# Patient Record
Sex: FEMALE | Race: WHITE | HISPANIC OR LATINO | URBAN - METROPOLITAN AREA
[De-identification: names, ages, dates, MRNs, and addresses within clinical notes are randomized per-mention and may not be internally consistent; named-entity substitution may affect disease eponyms.]

---

## 2018-06-21 ENCOUNTER — EMERGENCY (EMERGENCY)
Facility: HOSPITAL | Age: 23
LOS: 0 days | Discharge: HOME | End: 2018-06-21
Attending: EMERGENCY MEDICINE | Admitting: EMERGENCY MEDICINE
Payer: MEDICAID

## 2018-06-21 VITALS
DIASTOLIC BLOOD PRESSURE: 59 MMHG | HEART RATE: 88 BPM | RESPIRATION RATE: 18 BRPM | SYSTOLIC BLOOD PRESSURE: 101 MMHG | OXYGEN SATURATION: 100 %

## 2018-06-21 VITALS — WEIGHT: 106.04 LBS | HEIGHT: 62 IN

## 2018-06-21 LAB
ALBUMIN SERPL ELPH-MCNC: 4.1 G/DL — SIGNIFICANT CHANGE UP (ref 3.3–5)
ALLERGY+IMMUNOLOGY DIAG STUDY NOTE: SIGNIFICANT CHANGE UP
ALP SERPL-CCNC: 81 U/L — SIGNIFICANT CHANGE UP (ref 40–120)
ALT FLD-CCNC: 23 U/L — SIGNIFICANT CHANGE UP (ref 12–78)
ANION GAP SERPL CALC-SCNC: 8 MMOL/L — SIGNIFICANT CHANGE UP (ref 5–17)
APPEARANCE UR: CLEAR — SIGNIFICANT CHANGE UP
AST SERPL-CCNC: 19 U/L — SIGNIFICANT CHANGE UP (ref 15–37)
BACTERIA # UR AUTO: ABNORMAL
BASOPHILS # BLD AUTO: 0.03 K/UL — SIGNIFICANT CHANGE UP (ref 0–0.2)
BASOPHILS NFR BLD AUTO: 0.3 % — SIGNIFICANT CHANGE UP (ref 0–2)
BILIRUB SERPL-MCNC: 0.8 MG/DL — SIGNIFICANT CHANGE UP (ref 0.2–1.2)
BILIRUB UR-MCNC: NEGATIVE — SIGNIFICANT CHANGE UP
BUN SERPL-MCNC: 8 MG/DL — SIGNIFICANT CHANGE UP (ref 7–23)
CALCIUM SERPL-MCNC: 8.7 MG/DL — SIGNIFICANT CHANGE UP (ref 8.5–10.1)
CHLORIDE SERPL-SCNC: 103 MMOL/L — SIGNIFICANT CHANGE UP (ref 96–108)
CO2 SERPL-SCNC: 26 MMOL/L — SIGNIFICANT CHANGE UP (ref 22–31)
COLOR SPEC: YELLOW — SIGNIFICANT CHANGE UP
CREAT SERPL-MCNC: 0.64 MG/DL — SIGNIFICANT CHANGE UP (ref 0.5–1.3)
DIFF PNL FLD: ABNORMAL
EOSINOPHIL # BLD AUTO: 0.01 K/UL — SIGNIFICANT CHANGE UP (ref 0–0.5)
EOSINOPHIL NFR BLD AUTO: 0.1 % — SIGNIFICANT CHANGE UP (ref 0–6)
EPI CELLS # UR: ABNORMAL
GLUCOSE SERPL-MCNC: 94 MG/DL — SIGNIFICANT CHANGE UP (ref 70–99)
GLUCOSE UR QL: NEGATIVE MG/DL — SIGNIFICANT CHANGE UP
HCT VFR BLD CALC: 32.8 % — LOW (ref 34.5–45)
HCT VFR BLD CALC: 36.8 % — SIGNIFICANT CHANGE UP (ref 34.5–45)
HGB BLD-MCNC: 11.2 G/DL — LOW (ref 11.5–15.5)
HGB BLD-MCNC: 12.5 G/DL — SIGNIFICANT CHANGE UP (ref 11.5–15.5)
IMM GRANULOCYTES NFR BLD AUTO: 0.3 % — SIGNIFICANT CHANGE UP (ref 0–1.5)
INR BLD: 1.24 RATIO — HIGH (ref 0.88–1.16)
KETONES UR-MCNC: ABNORMAL
LEUKOCYTE ESTERASE UR-ACNC: ABNORMAL
LYMPHOCYTES # BLD AUTO: 0.95 K/UL — LOW (ref 1–3.3)
LYMPHOCYTES # BLD AUTO: 8.2 % — LOW (ref 13–44)
MCHC RBC-ENTMCNC: 31.5 PG — SIGNIFICANT CHANGE UP (ref 27–34)
MCHC RBC-ENTMCNC: 34 GM/DL — SIGNIFICANT CHANGE UP (ref 32–36)
MCV RBC AUTO: 92.7 FL — SIGNIFICANT CHANGE UP (ref 80–100)
MONOCYTES # BLD AUTO: 0.77 K/UL — SIGNIFICANT CHANGE UP (ref 0–0.9)
MONOCYTES NFR BLD AUTO: 6.6 % — SIGNIFICANT CHANGE UP (ref 2–14)
NEUTROPHILS # BLD AUTO: 9.78 K/UL — HIGH (ref 1.8–7.4)
NEUTROPHILS NFR BLD AUTO: 84.5 % — HIGH (ref 43–77)
NITRITE UR-MCNC: NEGATIVE — SIGNIFICANT CHANGE UP
NRBC # BLD: 0 /100 WBCS — SIGNIFICANT CHANGE UP (ref 0–0)
PH UR: 7 — SIGNIFICANT CHANGE UP (ref 5–8)
PLATELET # BLD AUTO: 322 K/UL — SIGNIFICANT CHANGE UP (ref 150–400)
POTASSIUM SERPL-MCNC: 4.2 MMOL/L — SIGNIFICANT CHANGE UP (ref 3.5–5.3)
POTASSIUM SERPL-SCNC: 4.2 MMOL/L — SIGNIFICANT CHANGE UP (ref 3.5–5.3)
PROT SERPL-MCNC: 7.9 GM/DL — SIGNIFICANT CHANGE UP (ref 6–8.3)
PROT UR-MCNC: NEGATIVE MG/DL — SIGNIFICANT CHANGE UP
PROTHROM AB SERPL-ACNC: 13.4 SEC — HIGH (ref 9.8–12.7)
RBC # BLD: 3.97 M/UL — SIGNIFICANT CHANGE UP (ref 3.8–5.2)
RBC # FLD: 12.5 % — SIGNIFICANT CHANGE UP (ref 10.3–14.5)
RBC CASTS # UR COMP ASSIST: SIGNIFICANT CHANGE UP /HPF (ref 0–4)
SODIUM SERPL-SCNC: 137 MMOL/L — SIGNIFICANT CHANGE UP (ref 135–145)
SP GR SPEC: 1.01 — SIGNIFICANT CHANGE UP (ref 1.01–1.02)
UROBILINOGEN FLD QL: NEGATIVE MG/DL — SIGNIFICANT CHANGE UP
WBC # BLD: 11.58 K/UL — HIGH (ref 3.8–10.5)
WBC # FLD AUTO: 11.58 K/UL — HIGH (ref 3.8–10.5)
WBC UR QL: SIGNIFICANT CHANGE UP

## 2018-06-21 PROCEDURE — 74177 CT ABD & PELVIS W/CONTRAST: CPT | Mod: 26

## 2018-06-21 PROCEDURE — 76856 US EXAM PELVIC COMPLETE: CPT | Mod: 26

## 2018-06-21 PROCEDURE — 99284 EMERGENCY DEPT VISIT MOD MDM: CPT

## 2018-06-21 RX ORDER — OXYCODONE HYDROCHLORIDE 5 MG/1
1 TABLET ORAL
Qty: 12 | Refills: 0 | OUTPATIENT
Start: 2018-06-21 | End: 2018-06-23

## 2018-06-21 RX ORDER — SODIUM CHLORIDE 9 MG/ML
1000 INJECTION INTRAMUSCULAR; INTRAVENOUS; SUBCUTANEOUS ONCE
Qty: 0 | Refills: 0 | Status: COMPLETED | OUTPATIENT
Start: 2018-06-21 | End: 2018-06-21

## 2018-06-21 RX ORDER — ONDANSETRON 8 MG/1
4 TABLET, FILM COATED ORAL ONCE
Qty: 0 | Refills: 0 | Status: COMPLETED | OUTPATIENT
Start: 2018-06-21 | End: 2018-06-21

## 2018-06-21 RX ORDER — SODIUM CHLORIDE 9 MG/ML
500 INJECTION INTRAMUSCULAR; INTRAVENOUS; SUBCUTANEOUS ONCE
Qty: 0 | Refills: 0 | Status: COMPLETED | OUTPATIENT
Start: 2018-06-21 | End: 2018-06-21

## 2018-06-21 RX ORDER — ACETAMINOPHEN 500 MG
1000 TABLET ORAL ONCE
Qty: 0 | Refills: 0 | Status: COMPLETED | OUTPATIENT
Start: 2018-06-21 | End: 2018-06-21

## 2018-06-21 RX ADMIN — ONDANSETRON 4 MILLIGRAM(S): 8 TABLET, FILM COATED ORAL at 19:55

## 2018-06-21 RX ADMIN — Medication 1000 MILLIGRAM(S): at 19:54

## 2018-06-21 RX ADMIN — SODIUM CHLORIDE 500 MILLILITER(S): 9 INJECTION INTRAMUSCULAR; INTRAVENOUS; SUBCUTANEOUS at 22:22

## 2018-06-21 RX ADMIN — SODIUM CHLORIDE 1000 MILLILITER(S): 9 INJECTION INTRAMUSCULAR; INTRAVENOUS; SUBCUTANEOUS at 19:30

## 2018-06-21 NOTE — ED STATDOCS - OBJECTIVE STATEMENT
21 y/o female with no pertinent past medical history presents to the ED c/o fever, 15 days abd pain, lower back pain, today felt feverish and nauseous along with (+) body aches.  Denies any vomiting/diarrhea, painful urination, Pt has been taking Acetaminophen for the pain. [facility] 5/25. Pt has no PCP.

## 2018-06-21 NOTE — ED STATDOCS - MEDICAL DECISION MAKING DETAILS
Repeat Hemoglobin 11.2.  Pt with improvement in pain.  Discussed CT and US findings with Dr. Barnes over phone, states agrees with us that patient may be discharged home at this time, supportive care, f/u with gynecology as outpatient.

## 2018-06-21 NOTE — ED STATDOCS - ENMT, MLM
Nasal mucosa clear. Slightly dry mucous membranes.    Throat has no vesicles, no oropharyngeal exudates and uvula is midline.

## 2018-06-21 NOTE — ED ADULT NURSE NOTE - OBJECTIVE STATEMENT
Pt reprots to ED compaining of abdominal pain and generalized body aches. Pt reports that its has been for past 2 days, but today she developed a fever

## 2018-06-21 NOTE — ED STATDOCS - PROGRESS NOTE DETAILS
signed Sabine Dobson PA-C Pt seen initially in intake by Dr Strauss.  ID [id number]  Pt here with her . Pt c/o lower abd pain x 15 days which worsened last night. pt denies vaginal symptoms, urinary symptoms, N/V/D. subjective fever. Pt states today she had pain in her "bones and head". Pt alert, NAD, is letting her  speak for her as pt says it hurts to talk. Afterwards I spoke to pt in private and pt stated that the man she is with is her , she feels safe at home, and does not have any other concerns or anything she wishes to confide in privacy. Plan labs, UA, CT. Pt agrees with plan of  care. signed Sabine Dobson PA-C   As per Dr Strauss, CT shows bleeding ovarian cyst and discussed results with pt using  services. Plan order type, sono, and gyn consult. Pt agrees with plan of  care. signed Sabine Dobson PA-C signed Sabine Dobson PA-C  Spoke to Dr Robertson, will see pt in consult, requests repeat H/H first.

## 2018-06-22 DIAGNOSIS — M79.1 MYALGIA: ICD-10-CM

## 2018-06-22 DIAGNOSIS — R50.9 FEVER, UNSPECIFIED: ICD-10-CM

## 2018-06-22 DIAGNOSIS — N83.299 OTHER OVARIAN CYST, UNSPECIFIED SIDE: ICD-10-CM

## 2018-06-22 DIAGNOSIS — R10.9 UNSPECIFIED ABDOMINAL PAIN: ICD-10-CM

## 2018-06-22 LAB — ABO RH CONFIRMATION: SIGNIFICANT CHANGE UP

## 2018-06-24 RX ORDER — CEPHALEXIN 500 MG
1 CAPSULE ORAL
Qty: 14 | Refills: 0 | OUTPATIENT
Start: 2018-06-24 | End: 2018-06-30

## 2018-06-24 NOTE — ED POST DISCHARGE NOTE - RESULT SUMMARY
Patient contacted with , ID #[id number]. Urine culture shows Ecoli, sensitive to keflex. Contacted patient who states she has been feeling better after leaving the ED. Advised patient I would be sending ABX to the pharmacy. Advised patient to continue full dose and follow up with primary care doctor and GYN as driected from discharge.   Ramsey Marley PA-C

## 2018-06-25 ENCOUNTER — EMERGENCY (EMERGENCY)
Facility: HOSPITAL | Age: 23
LOS: 0 days | Discharge: HOME | End: 2018-06-26
Attending: EMERGENCY MEDICINE | Admitting: EMERGENCY MEDICINE
Payer: MEDICAID

## 2018-06-25 VITALS
HEART RATE: 77 BPM | OXYGEN SATURATION: 100 % | SYSTOLIC BLOOD PRESSURE: 110 MMHG | RESPIRATION RATE: 16 BRPM | TEMPERATURE: 99 F | DIASTOLIC BLOOD PRESSURE: 78 MMHG

## 2018-06-25 VITALS — HEIGHT: 61 IN | WEIGHT: 106.04 LBS

## 2018-06-25 DIAGNOSIS — R10.32 LEFT LOWER QUADRANT PAIN: ICD-10-CM

## 2018-06-25 DIAGNOSIS — N83.299 OTHER OVARIAN CYST, UNSPECIFIED SIDE: ICD-10-CM

## 2018-06-25 PROCEDURE — 99053 MED SERV 10PM-8AM 24 HR FAC: CPT

## 2018-06-25 PROCEDURE — 99284 EMERGENCY DEPT VISIT MOD MDM: CPT | Mod: 25

## 2018-06-25 RX ORDER — MORPHINE SULFATE 50 MG/1
4 CAPSULE, EXTENDED RELEASE ORAL ONCE
Qty: 0 | Refills: 0 | Status: DISCONTINUED | OUTPATIENT
Start: 2018-06-25 | End: 2018-06-25

## 2018-06-25 RX ORDER — SODIUM CHLORIDE 9 MG/ML
1000 INJECTION INTRAMUSCULAR; INTRAVENOUS; SUBCUTANEOUS ONCE
Qty: 0 | Refills: 0 | Status: COMPLETED | OUTPATIENT
Start: 2018-06-25 | End: 2018-06-25

## 2018-06-25 RX ORDER — ONDANSETRON 8 MG/1
4 TABLET, FILM COATED ORAL ONCE
Qty: 0 | Refills: 0 | Status: COMPLETED | OUTPATIENT
Start: 2018-06-25 | End: 2018-06-26

## 2018-06-25 RX ORDER — SODIUM CHLORIDE 9 MG/ML
3 INJECTION INTRAMUSCULAR; INTRAVENOUS; SUBCUTANEOUS ONCE
Qty: 0 | Refills: 0 | Status: COMPLETED | OUTPATIENT
Start: 2018-06-25 | End: 2018-06-25

## 2018-06-25 RX ADMIN — SODIUM CHLORIDE 3 MILLILITER(S): 9 INJECTION INTRAMUSCULAR; INTRAVENOUS; SUBCUTANEOUS at 23:57

## 2018-06-25 RX ADMIN — SODIUM CHLORIDE 1000 MILLILITER(S): 9 INJECTION INTRAMUSCULAR; INTRAVENOUS; SUBCUTANEOUS at 23:57

## 2018-06-25 NOTE — ED ADULT NURSE NOTE - OBJECTIVE STATEMENT
pt is 22 y.o. female c/o LLQ abdominal pain for several days, pt denies any trauma or injury to the area. pt does not have a primary care doctor. no change in bowel or bladder habits. LLQ tender to touch, denies radiation to other areas.

## 2018-06-26 LAB
ALBUMIN SERPL ELPH-MCNC: 3.7 G/DL — SIGNIFICANT CHANGE UP (ref 3.3–5)
ALP SERPL-CCNC: 81 U/L — SIGNIFICANT CHANGE UP (ref 40–120)
ALT FLD-CCNC: 39 U/L — SIGNIFICANT CHANGE UP (ref 12–78)
ANION GAP SERPL CALC-SCNC: 5 MMOL/L — SIGNIFICANT CHANGE UP (ref 5–17)
APPEARANCE UR: CLEAR — SIGNIFICANT CHANGE UP
APTT BLD: 32.9 SEC — SIGNIFICANT CHANGE UP (ref 27.5–37.4)
AST SERPL-CCNC: 45 U/L — HIGH (ref 15–37)
BACTERIA # UR AUTO: ABNORMAL
BASOPHILS # BLD AUTO: 0.03 K/UL — SIGNIFICANT CHANGE UP (ref 0–0.2)
BASOPHILS NFR BLD AUTO: 0.5 % — SIGNIFICANT CHANGE UP (ref 0–2)
BILIRUB SERPL-MCNC: 0.4 MG/DL — SIGNIFICANT CHANGE UP (ref 0.2–1.2)
BILIRUB UR-MCNC: NEGATIVE — SIGNIFICANT CHANGE UP
BLD GP AB SCN SERPL QL: SIGNIFICANT CHANGE UP
BUN SERPL-MCNC: 7 MG/DL — SIGNIFICANT CHANGE UP (ref 7–23)
CALCIUM SERPL-MCNC: 8.8 MG/DL — SIGNIFICANT CHANGE UP (ref 8.5–10.1)
CHLORIDE SERPL-SCNC: 102 MMOL/L — SIGNIFICANT CHANGE UP (ref 96–108)
CO2 SERPL-SCNC: 30 MMOL/L — SIGNIFICANT CHANGE UP (ref 22–31)
COLOR SPEC: YELLOW — SIGNIFICANT CHANGE UP
CREAT SERPL-MCNC: 0.57 MG/DL — SIGNIFICANT CHANGE UP (ref 0.5–1.3)
DIFF PNL FLD: ABNORMAL
EOSINOPHIL # BLD AUTO: 0.12 K/UL — SIGNIFICANT CHANGE UP (ref 0–0.5)
EOSINOPHIL NFR BLD AUTO: 1.9 % — SIGNIFICANT CHANGE UP (ref 0–6)
EPI CELLS # UR: SIGNIFICANT CHANGE UP
GLUCOSE SERPL-MCNC: 81 MG/DL — SIGNIFICANT CHANGE UP (ref 70–99)
GLUCOSE UR QL: NEGATIVE MG/DL — SIGNIFICANT CHANGE UP
HCG SERPL-ACNC: <1 MIU/ML — SIGNIFICANT CHANGE UP
HCT VFR BLD CALC: 37.7 % — SIGNIFICANT CHANGE UP (ref 34.5–45)
HGB BLD-MCNC: 12.7 G/DL — SIGNIFICANT CHANGE UP (ref 11.5–15.5)
IMM GRANULOCYTES NFR BLD AUTO: 0.2 % — SIGNIFICANT CHANGE UP (ref 0–1.5)
INR BLD: 1.06 RATIO — SIGNIFICANT CHANGE UP (ref 0.88–1.16)
KETONES UR-MCNC: NEGATIVE — SIGNIFICANT CHANGE UP
LEUKOCYTE ESTERASE UR-ACNC: NEGATIVE — SIGNIFICANT CHANGE UP
LIDOCAIN IGE QN: 142 U/L — SIGNIFICANT CHANGE UP (ref 73–393)
LYMPHOCYTES # BLD AUTO: 1.43 K/UL — SIGNIFICANT CHANGE UP (ref 1–3.3)
LYMPHOCYTES # BLD AUTO: 22.2 % — SIGNIFICANT CHANGE UP (ref 13–44)
MCHC RBC-ENTMCNC: 31.1 PG — SIGNIFICANT CHANGE UP (ref 27–34)
MCHC RBC-ENTMCNC: 33.7 GM/DL — SIGNIFICANT CHANGE UP (ref 32–36)
MCV RBC AUTO: 92.4 FL — SIGNIFICANT CHANGE UP (ref 80–100)
MONOCYTES # BLD AUTO: 0.74 K/UL — SIGNIFICANT CHANGE UP (ref 0–0.9)
MONOCYTES NFR BLD AUTO: 11.5 % — SIGNIFICANT CHANGE UP (ref 2–14)
NEUTROPHILS # BLD AUTO: 4.11 K/UL — SIGNIFICANT CHANGE UP (ref 1.8–7.4)
NEUTROPHILS NFR BLD AUTO: 63.7 % — SIGNIFICANT CHANGE UP (ref 43–77)
NITRITE UR-MCNC: NEGATIVE — SIGNIFICANT CHANGE UP
NRBC # BLD: 0 /100 WBCS — SIGNIFICANT CHANGE UP (ref 0–0)
PH UR: 7 — SIGNIFICANT CHANGE UP (ref 5–8)
PLATELET # BLD AUTO: 420 K/UL — HIGH (ref 150–400)
POTASSIUM SERPL-MCNC: 4.1 MMOL/L — SIGNIFICANT CHANGE UP (ref 3.5–5.3)
POTASSIUM SERPL-SCNC: 4.1 MMOL/L — SIGNIFICANT CHANGE UP (ref 3.5–5.3)
PROT SERPL-MCNC: 7.9 GM/DL — SIGNIFICANT CHANGE UP (ref 6–8.3)
PROT UR-MCNC: NEGATIVE MG/DL — SIGNIFICANT CHANGE UP
PROTHROM AB SERPL-ACNC: 11.5 SEC — SIGNIFICANT CHANGE UP (ref 9.8–12.7)
RBC # BLD: 4.08 M/UL — SIGNIFICANT CHANGE UP (ref 3.8–5.2)
RBC # FLD: 11.9 % — SIGNIFICANT CHANGE UP (ref 10.3–14.5)
RBC CASTS # UR COMP ASSIST: NEGATIVE /HPF — SIGNIFICANT CHANGE UP (ref 0–4)
SODIUM SERPL-SCNC: 137 MMOL/L — SIGNIFICANT CHANGE UP (ref 135–145)
SP GR SPEC: 1 — LOW (ref 1.01–1.02)
TYPE + AB SCN PNL BLD: SIGNIFICANT CHANGE UP
UROBILINOGEN FLD QL: NEGATIVE MG/DL — SIGNIFICANT CHANGE UP
WBC # BLD: 6.44 K/UL — SIGNIFICANT CHANGE UP (ref 3.8–10.5)
WBC # FLD AUTO: 6.44 K/UL — SIGNIFICANT CHANGE UP (ref 3.8–10.5)
WBC UR QL: SIGNIFICANT CHANGE UP

## 2018-06-26 PROCEDURE — 76830 TRANSVAGINAL US NON-OB: CPT | Mod: 26

## 2018-06-26 RX ADMIN — ONDANSETRON 4 MILLIGRAM(S): 8 TABLET, FILM COATED ORAL at 01:54

## 2018-06-26 RX ADMIN — MORPHINE SULFATE 4 MILLIGRAM(S): 50 CAPSULE, EXTENDED RELEASE ORAL at 01:54

## 2018-06-26 NOTE — ED ADULT NURSE REASSESSMENT NOTE - NS ED NURSE REASSESS COMMENT FT1
patient discharged home. iv taken out. written and verbal discharge and followup instructions given to patient, patient verbalized back understanding. additional verbal instructions given. discharged home with significant other at 0402.

## 2018-06-26 NOTE — ED STATDOCS - OBJECTIVE STATEMENT
Pt. is a 23 yo F with lower abdominal pain for the past few days.  Pt. states she was in the ED for the same recently and was diagnosed with ruptured ovarian cyst.  Pt. still needs to follow up with gynecologist.   did not  antibiotic at the pharmacy yet.  Pt. completed pain meds already and is now back in pain.   brought pt. for another prescription of pain meds.  Pt. states there is pain in lower abdomen and left flank.  No vomiting or fever per patient.  Some pain with urination.

## 2018-06-26 NOTE — ED POST DISCHARGE NOTE - RESULT SUMMARY
AMY Ecoli sensitive to cephalosporins [id number]+ CFUs PT discharged on appropriate Tx.  PT called back at all points of contact.  Message left to call HH back.  AMY 6/26/18.

## 2018-06-26 NOTE — ED STATDOCS - MEDICAL DECISION MAKING DETAILS
Pain from ruptured ovarian cyst and slight UTI.  Pt. to continue treatment for UTI; need to check labs to make sure hemoglobin stable and pelvic fluid resolving.

## 2018-06-27 ENCOUNTER — RESULT REVIEW (OUTPATIENT)
Age: 23
End: 2018-06-27

## 2018-07-12 ENCOUNTER — EMERGENCY (EMERGENCY)
Facility: HOSPITAL | Age: 23
LOS: 0 days | Discharge: HOME | End: 2018-07-12
Attending: EMERGENCY MEDICINE | Admitting: EMERGENCY MEDICINE
Payer: MEDICAID

## 2018-07-12 VITALS
RESPIRATION RATE: 16 BRPM | TEMPERATURE: 100 F | HEART RATE: 112 BPM | OXYGEN SATURATION: 100 % | DIASTOLIC BLOOD PRESSURE: 73 MMHG | SYSTOLIC BLOOD PRESSURE: 116 MMHG

## 2018-07-12 VITALS
SYSTOLIC BLOOD PRESSURE: 112 MMHG | HEIGHT: 61 IN | OXYGEN SATURATION: 100 % | HEART RATE: 99 BPM | WEIGHT: 110.01 LBS | RESPIRATION RATE: 17 BRPM | TEMPERATURE: 98 F | DIASTOLIC BLOOD PRESSURE: 70 MMHG

## 2018-07-12 VITALS
OXYGEN SATURATION: 100 % | HEART RATE: 89 BPM | TEMPERATURE: 98 F | RESPIRATION RATE: 19 BRPM | SYSTOLIC BLOOD PRESSURE: 121 MMHG | DIASTOLIC BLOOD PRESSURE: 78 MMHG

## 2018-07-12 VITALS — HEIGHT: 62 IN | WEIGHT: 115.08 LBS

## 2018-07-12 DIAGNOSIS — Z11.8 ENCOUNTER FOR SCREENING FOR OTHER INFECTIOUS AND PARASITIC DISEASES: ICD-10-CM

## 2018-07-12 DIAGNOSIS — N30.91 CYSTITIS, UNSPECIFIED WITH HEMATURIA: ICD-10-CM

## 2018-07-12 DIAGNOSIS — R30.0 DYSURIA: ICD-10-CM

## 2018-07-12 DIAGNOSIS — R10.9 UNSPECIFIED ABDOMINAL PAIN: ICD-10-CM

## 2018-07-12 DIAGNOSIS — R30.9 PAINFUL MICTURITION, UNSPECIFIED: ICD-10-CM

## 2018-07-12 LAB
APPEARANCE UR: CLEAR — SIGNIFICANT CHANGE UP
BACTERIA # UR AUTO: ABNORMAL
BILIRUB UR-MCNC: NEGATIVE — SIGNIFICANT CHANGE UP
COLOR SPEC: YELLOW — SIGNIFICANT CHANGE UP
DIFF PNL FLD: ABNORMAL
EPI CELLS # UR: SIGNIFICANT CHANGE UP
GLUCOSE UR QL: NEGATIVE MG/DL — SIGNIFICANT CHANGE UP
KETONES UR-MCNC: ABNORMAL
LEUKOCYTE ESTERASE UR-ACNC: ABNORMAL
NITRITE UR-MCNC: NEGATIVE — SIGNIFICANT CHANGE UP
PH UR: 6.5 — SIGNIFICANT CHANGE UP (ref 5–8)
PROT UR-MCNC: 30 MG/DL
RBC CASTS # UR COMP ASSIST: ABNORMAL /HPF (ref 0–4)
SP GR SPEC: 1.01 — SIGNIFICANT CHANGE UP (ref 1.01–1.02)
UROBILINOGEN FLD QL: 1 MG/DL
WBC UR QL: ABNORMAL

## 2018-07-12 PROCEDURE — 99284 EMERGENCY DEPT VISIT MOD MDM: CPT | Mod: 25

## 2018-07-12 RX ORDER — NITROFURANTOIN MACROCRYSTAL 50 MG
100 CAPSULE ORAL ONCE
Qty: 0 | Refills: 0 | Status: COMPLETED | OUTPATIENT
Start: 2018-07-12 | End: 2018-07-12

## 2018-07-12 RX ORDER — PHENAZOPYRIDINE HCL 100 MG
100 TABLET ORAL ONCE
Qty: 0 | Refills: 0 | Status: COMPLETED | OUTPATIENT
Start: 2018-07-12 | End: 2018-07-12

## 2018-07-12 RX ORDER — NITROFURANTOIN MACROCRYSTAL 50 MG
1 CAPSULE ORAL
Qty: 20 | Refills: 0 | OUTPATIENT
Start: 2018-07-12 | End: 2018-07-21

## 2018-07-12 RX ORDER — PHENAZOPYRIDINE HCL 100 MG
2 TABLET ORAL
Qty: 12 | Refills: 0 | OUTPATIENT
Start: 2018-07-12 | End: 2018-07-13

## 2018-07-12 RX ORDER — IBUPROFEN 200 MG
600 TABLET ORAL ONCE
Qty: 0 | Refills: 0 | Status: COMPLETED | OUTPATIENT
Start: 2018-07-12 | End: 2018-07-12

## 2018-07-12 RX ADMIN — Medication 600 MILLIGRAM(S): at 21:17

## 2018-07-12 RX ADMIN — Medication 100 MILLIGRAM(S): at 21:17

## 2018-07-12 NOTE — ED STATDOCS - PROGRESS NOTE DETAILS
23 yo female presents with continued dysuria, burning sensation x 4 days. Pt was seen yesterday started n cipro and states the pain is still present. +sub fever. Denies abd pain, n/v/d,c/sweating.

## 2018-07-12 NOTE — ED STATDOCS - MEDICAL DECISION MAKING DETAILS
Plan for ibuprofen Macrobid and peridium, reassess. Plan for ibuprofen Macrobid and pyridium, reassess.

## 2018-07-12 NOTE — ED PROVIDER NOTE - OBJECTIVE STATEMENT
pt presents with suyprpubinc abd pain intermittent achy non radiating a/w dysuria on outpt abx from [facility] no vaginal bleeding or dichsrge . denies fever. denies HA or neck pain. no chest pain or sob. no n/v/d. . no back pain. no motor or sensory deficits. denies illicit drug use. no recent travel. no rash. no other acute issues symptoms or concerns
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
## 2018-07-12 NOTE — ED ADULT NURSE NOTE - CHPI ED SYMPTOMS NEG
no chills/no nausea/no tingling/no decreased eating/drinking/no dizziness/no vomiting/no weakness/no numbness/no fever

## 2018-07-12 NOTE — ED ADULT NURSE NOTE - CHIEF COMPLAINT QUOTE
fever, urinary difficulty. Was seen yesterday in [facility] center and in ED. Given cipro. Taking for one day with no sx improvement.

## 2018-07-12 NOTE — ED PROVIDER NOTE - MEDICAL DECISION MAKING DETAILS
cont outpt abx f/u [facility] return to ed for intractable abd pain fever any overall worsening

## 2018-07-12 NOTE — ED ADULT TRIAGE NOTE - CHIEF COMPLAINT QUOTE
fever, urinary difficulty. Was seen yesterday in [facility] center and in ED. Given cipro. Taking for one day with no sx improvement.

## 2018-07-12 NOTE — ED ADULT NURSE NOTE - OBJECTIVE STATEMENT
pt arrives to ED ambulatory complaining of urinary symptoms. pt was seen yesterday in ED for urainry symptoms and was given antibx for UTI. alert and oriented x 4.

## 2018-07-12 NOTE — ED STATDOCS - ATTENDING CONTRIBUTION TO CARE
Attending Contribution to Care: I, Kymberly Sandhu, performed the initial face to face bedside interview with this patient regarding history of present illness, review of symptoms and relevant past medical, social and family history.  I completed an independent physical examination.  I was the initial provider who evaluated this patient. I have signed out the follow up of any pending tests (i.e. labs, radiological studies) to the ACP.  I have communicated the patient’s plan of care and disposition with the ACP.

## 2018-07-12 NOTE — ED ADULT NURSE NOTE - OBJECTIVE STATEMENT
Pt presented to ED c/o urinary symptoms. As per pt, pt's been experiencing burning on urination, urinary frequency for 3 days. Was seen @ urgent care yesterday as sent home w/ ABX. Pt came in tonight due to continuations of urinary pain, fever, and HA. Took acetaminophen 1 hr PTA.

## 2018-07-13 LAB
CULTURE RESULTS: NO GROWTH — SIGNIFICANT CHANGE UP
CULTURE RESULTS: NO GROWTH — SIGNIFICANT CHANGE UP
SPECIMEN SOURCE: SIGNIFICANT CHANGE UP
SPECIMEN SOURCE: SIGNIFICANT CHANGE UP

## 2018-09-01 ENCOUNTER — OUTPATIENT (OUTPATIENT)
Dept: OUTPATIENT SERVICES | Facility: HOSPITAL | Age: 23
LOS: 1 days | End: 2018-09-01

## 2018-09-16 ENCOUNTER — EMERGENCY (EMERGENCY)
Facility: HOSPITAL | Age: 23
LOS: 0 days | Discharge: HOME | End: 2018-09-16
Attending: EMERGENCY MEDICINE | Admitting: EMERGENCY MEDICINE
Payer: MEDICAID

## 2018-09-16 VITALS — WEIGHT: 110.89 LBS | HEIGHT: 60 IN

## 2018-09-16 VITALS
DIASTOLIC BLOOD PRESSURE: 63 MMHG | TEMPERATURE: 98 F | SYSTOLIC BLOOD PRESSURE: 105 MMHG | HEART RATE: 74 BPM | OXYGEN SATURATION: 100 %

## 2018-09-16 DIAGNOSIS — Z3A.01 LESS THAN 8 WEEKS GESTATION OF PREGNANCY: ICD-10-CM

## 2018-09-16 DIAGNOSIS — O20.9 HEMORRHAGE IN EARLY PREGNANCY, UNSPECIFIED: ICD-10-CM

## 2018-09-16 LAB
ALBUMIN SERPL ELPH-MCNC: 4.1 G/DL — SIGNIFICANT CHANGE UP (ref 3.3–5)
ALP SERPL-CCNC: 50 U/L — SIGNIFICANT CHANGE UP (ref 40–120)
ALT FLD-CCNC: 40 U/L — SIGNIFICANT CHANGE UP (ref 12–78)
ANION GAP SERPL CALC-SCNC: 7 MMOL/L — SIGNIFICANT CHANGE UP (ref 5–17)
APPEARANCE UR: CLEAR — SIGNIFICANT CHANGE UP
AST SERPL-CCNC: 20 U/L — SIGNIFICANT CHANGE UP (ref 15–37)
BACTERIA # UR AUTO: ABNORMAL
BASOPHILS # BLD AUTO: 0.06 K/UL — SIGNIFICANT CHANGE UP (ref 0–0.2)
BASOPHILS NFR BLD AUTO: 0.7 % — SIGNIFICANT CHANGE UP (ref 0–2)
BILIRUB SERPL-MCNC: 0.3 MG/DL — SIGNIFICANT CHANGE UP (ref 0.2–1.2)
BILIRUB UR-MCNC: NEGATIVE — SIGNIFICANT CHANGE UP
BLD GP AB SCN SERPL QL: SIGNIFICANT CHANGE UP
BUN SERPL-MCNC: 9 MG/DL — SIGNIFICANT CHANGE UP (ref 7–23)
CALCIUM SERPL-MCNC: 8.9 MG/DL — SIGNIFICANT CHANGE UP (ref 8.5–10.1)
CHLORIDE SERPL-SCNC: 103 MMOL/L — SIGNIFICANT CHANGE UP (ref 96–108)
CO2 SERPL-SCNC: 27 MMOL/L — SIGNIFICANT CHANGE UP (ref 22–31)
COLOR SPEC: YELLOW — SIGNIFICANT CHANGE UP
CREAT SERPL-MCNC: 0.53 MG/DL — SIGNIFICANT CHANGE UP (ref 0.5–1.3)
DIFF PNL FLD: NEGATIVE — SIGNIFICANT CHANGE UP
EOSINOPHIL # BLD AUTO: 0.13 K/UL — SIGNIFICANT CHANGE UP (ref 0–0.5)
EOSINOPHIL NFR BLD AUTO: 1.5 % — SIGNIFICANT CHANGE UP (ref 0–6)
EPI CELLS # UR: SIGNIFICANT CHANGE UP
GLUCOSE SERPL-MCNC: 83 MG/DL — SIGNIFICANT CHANGE UP (ref 70–99)
GLUCOSE UR QL: NEGATIVE MG/DL — SIGNIFICANT CHANGE UP
HCG SERPL-ACNC: HIGH MIU/ML
HCT VFR BLD CALC: 38 % — SIGNIFICANT CHANGE UP (ref 34.5–45)
HGB BLD-MCNC: 13.3 G/DL — SIGNIFICANT CHANGE UP (ref 11.5–15.5)
IMM GRANULOCYTES NFR BLD AUTO: 0.3 % — SIGNIFICANT CHANGE UP (ref 0–1.5)
KETONES UR-MCNC: NEGATIVE — SIGNIFICANT CHANGE UP
LEUKOCYTE ESTERASE UR-ACNC: ABNORMAL
LYMPHOCYTES # BLD AUTO: 1.84 K/UL — SIGNIFICANT CHANGE UP (ref 1–3.3)
LYMPHOCYTES # BLD AUTO: 20.7 % — SIGNIFICANT CHANGE UP (ref 13–44)
MCHC RBC-ENTMCNC: 31.4 PG — SIGNIFICANT CHANGE UP (ref 27–34)
MCHC RBC-ENTMCNC: 35 GM/DL — SIGNIFICANT CHANGE UP (ref 32–36)
MCV RBC AUTO: 89.6 FL — SIGNIFICANT CHANGE UP (ref 80–100)
MONOCYTES # BLD AUTO: 0.62 K/UL — SIGNIFICANT CHANGE UP (ref 0–0.9)
MONOCYTES NFR BLD AUTO: 7 % — SIGNIFICANT CHANGE UP (ref 2–14)
NEUTROPHILS # BLD AUTO: 6.22 K/UL — SIGNIFICANT CHANGE UP (ref 1.8–7.4)
NEUTROPHILS NFR BLD AUTO: 69.8 % — SIGNIFICANT CHANGE UP (ref 43–77)
NITRITE UR-MCNC: NEGATIVE — SIGNIFICANT CHANGE UP
NRBC # BLD: 0 /100 WBCS — SIGNIFICANT CHANGE UP (ref 0–0)
PH UR: 6.5 — SIGNIFICANT CHANGE UP (ref 5–8)
PLATELET # BLD AUTO: 363 K/UL — SIGNIFICANT CHANGE UP (ref 150–400)
POTASSIUM SERPL-MCNC: 3.9 MMOL/L — SIGNIFICANT CHANGE UP (ref 3.5–5.3)
POTASSIUM SERPL-SCNC: 3.9 MMOL/L — SIGNIFICANT CHANGE UP (ref 3.5–5.3)
PROT SERPL-MCNC: 7.8 GM/DL — SIGNIFICANT CHANGE UP (ref 6–8.3)
PROT UR-MCNC: NEGATIVE MG/DL — SIGNIFICANT CHANGE UP
RBC # BLD: 4.24 M/UL — SIGNIFICANT CHANGE UP (ref 3.8–5.2)
RBC # FLD: 12.1 % — SIGNIFICANT CHANGE UP (ref 10.3–14.5)
RBC CASTS # UR COMP ASSIST: SIGNIFICANT CHANGE UP /HPF (ref 0–4)
SODIUM SERPL-SCNC: 137 MMOL/L — SIGNIFICANT CHANGE UP (ref 135–145)
SP GR SPEC: 1.01 — SIGNIFICANT CHANGE UP (ref 1.01–1.02)
TYPE + AB SCN PNL BLD: SIGNIFICANT CHANGE UP
UROBILINOGEN FLD QL: NEGATIVE MG/DL — SIGNIFICANT CHANGE UP
WBC # BLD: 8.9 K/UL — SIGNIFICANT CHANGE UP (ref 3.8–10.5)
WBC # FLD AUTO: 8.9 K/UL — SIGNIFICANT CHANGE UP (ref 3.8–10.5)
WBC UR QL: SIGNIFICANT CHANGE UP

## 2018-09-16 PROCEDURE — 76830 TRANSVAGINAL US NON-OB: CPT | Mod: 26

## 2018-09-16 PROCEDURE — 99284 EMERGENCY DEPT VISIT MOD MDM: CPT

## 2018-09-16 RX ORDER — SODIUM CHLORIDE 9 MG/ML
1000 INJECTION INTRAMUSCULAR; INTRAVENOUS; SUBCUTANEOUS ONCE
Qty: 0 | Refills: 0 | Status: COMPLETED | OUTPATIENT
Start: 2018-09-16 | End: 2018-09-16

## 2018-09-16 RX ADMIN — SODIUM CHLORIDE 2000 MILLILITER(S): 9 INJECTION INTRAMUSCULAR; INTRAVENOUS; SUBCUTANEOUS at 17:53

## 2018-09-16 NOTE — ED STATDOCS - ATTENDING CONTRIBUTION TO CARE
I, Yakov Cavazos, performed the initial face to face bedside interview with this patient regarding history of present illness, review of symptoms and relevant past medical, social and family history.  I completed an independent physical examination.  I was the initial provider who evaluated this patient. I have signed out the follow up of any pending tests (i.e. labs, radiological studies) to the ACP.  I have communicated the patient’s plan of care and disposition with the ACP.  The history, relevant review of systems, past medical and surgical history, medical decision making, and physical examination was documented by the scribe in my presence and I attest to the accuracy of the documentation.

## 2018-09-16 NOTE — ED ADULT NURSE NOTE - NSIMPLEMENTINTERV_GEN_ALL_ED
Implemented All Universal Safety Interventions:  [place] to call system. Call bell, personal items and telephone within reach. Instruct patient to call for assistance. Room bathroom lighting operational. Non-slip footwear when patient is off stretcher. Physically safe environment: no spills, clutter or unnecessary equipment. Stretcher in lowest position, wheels locked, appropriate side rails in place.

## 2018-09-16 NOTE — ED ADULT TRIAGE NOTE - CHIEF COMPLAINT QUOTE
Pt is seven weeks pregnant. Pt reports that she noticed pink tinged blood today.  Denies any abd pain/cramping at this time.

## 2018-09-16 NOTE — ED STATDOCS - PROGRESS NOTE DETAILS
Pt is a 24 y/o female with no PMH, 7 weeks pregnant with c/o vaginal bleeding since this morning. Pt also endorses back pain which started several weeks ago around the time she noticed she was pregnant. Pt is followed by an OB/GYN. Plan is for pt to undergo labs, sono and reevaluation. -Liliana Kimbrough PA-C Pt doing well. Discussed normal lab findings and results for sono still pending. Will update pt when sono report is in. Discussed discharge if all is normal and follow up with OB/GYN at [facility] this week. -Liliana Kimbrough PA-C Discussed sonogram findings with pt and boyfriend. Expressed importance of 48 hour follow up with ob/gyn and they understand. Pt ready for discharge. -Liliana Kimbrough PA-C

## 2018-09-16 NOTE — ED STATDOCS - OBJECTIVE STATEMENT
22 y/o female with PMHx presents to the ED c/o vaginal bleeding noticed this morning. No clots. +back pain throughout pregnancy. Denies abd pain, pelvic pain. Pt is 7 weeks pregnant. LNMP July 25th. G1. Pt reports that she is following with an OB/GYN. PI #[id number].

## 2018-09-22 ENCOUNTER — EMERGENCY (EMERGENCY)
Facility: HOSPITAL | Age: 23
LOS: 0 days | Discharge: HOME | End: 2018-09-22
Attending: EMERGENCY MEDICINE
Payer: COMMERCIAL

## 2018-09-22 VITALS
RESPIRATION RATE: 18 BRPM | OXYGEN SATURATION: 100 % | DIASTOLIC BLOOD PRESSURE: 65 MMHG | SYSTOLIC BLOOD PRESSURE: 104 MMHG | HEART RATE: 82 BPM | TEMPERATURE: 98 F

## 2018-09-22 VITALS — WEIGHT: 110.89 LBS | HEIGHT: 61 IN

## 2018-09-22 DIAGNOSIS — M54.9 DORSALGIA, UNSPECIFIED: ICD-10-CM

## 2018-09-22 DIAGNOSIS — O99.351 DISEASES OF THE NERVOUS SYSTEM COMPLICATING PREGNANCY, FIRST TRIMESTER: ICD-10-CM
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
## 2018-09-22 DIAGNOSIS — O99.89 OTHER SPECIFIED DISEASES AND CONDITIONS COMPLICATING PREGNANCY, CHILDBIRTH AND THE PUERPERIUM: ICD-10-CM
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
## 2018-09-22 DIAGNOSIS — G89.11 ACUTE PAIN DUE TO TRAUMA: ICD-10-CM

## 2018-09-22 PROCEDURE — 99283 EMERGENCY DEPT VISIT LOW MDM: CPT

## 2018-09-22 NOTE — ED STATDOCS - NS_ ATTENDINGSCRIBEDETAILS _ED_A_ED_FT
I, Codey Brenner MD,  performed the initial face to face bedside interview with this patient regarding history of present illness, review of symptoms and relevant past medical, social and family history.  I completed an independent physical examination.  I was the initial provider who evaluated this patient.  The history, relevant review of systems, past medical and surgical history, medical decision making, and physical examination was documented by the scribe in my presence and I attest to the accuracy of the documentation.

## 2018-09-22 NOTE — ED ADULT NURSE NOTE - NSIMPLEMENTINTERV_GEN_ALL_ED
Implemented All Universal Safety Interventions:  [place] to call system. Call bell, personal items and telephone within reach. Instruct patient to call for assistance. Room bathroom lighting operational. Non-slip footwear when patient is off stretcher. Physically safe environment: no spills, clutter or unnecessary equipment. Stretcher in lowest position, wheels locked, appropriate side rails in place.
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
## 2018-09-22 NOTE — ED STATDOCS - NS ED ROS FT
Constitutional: No fever or chills  Eyes: No visual changes  HEENT: No throat pain  CV: No chest pain  Resp: No SOB no cough  GI: No abd pain, nausea or vomiting  : No dysuria  MSK: (+) back pain  Skin: No rash  Neuro: No headache

## 2018-09-22 NOTE — ED STATDOCS - PHYSICAL EXAMINATION
Constitutional: mild distress AAOx3  Eyes: PERRLA EOMI  Head: Normocephalic atraumatic  Mouth: MMM  Cardiac: regular rate   Resp: Lungs CTAB  GI: Abd s/nt/nd  Neuro: CN2-12 intact  Skin: No rashes, no bruising to back, chest or abdomen  Musculoskeletal: mild paraspinal ttp, no midline ttp, no ttp to chest wall, full ROM of bilateral LE and UE. Constitutional: NAD AAOx3  Eyes: PERRLA EOMI  Head: Normocephalic atraumatic  Mouth: MMM  Cardiac: regular rate   Resp: Lungs CTAB  GI: Abd s/nt/nd  Neuro: CN2-12 intact normal strength and sensation normal gait  Skin: No rashes, no bruising to back, chest or abdomen  Musculoskeletal: mild paraspinal ttp, no midline ttp, no ttp to chest wall, full ROM of bilateral LE and UE.
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
## 2018-10-02 DIAGNOSIS — Z71.89 OTHER SPECIFIED COUNSELING: ICD-10-CM

## 2018-10-19 PROBLEM — Z00.00 ENCOUNTER FOR PREVENTIVE HEALTH EXAMINATION: Status: ACTIVE | Noted: 2018-10-19

## 2018-10-24 ENCOUNTER — APPOINTMENT (OUTPATIENT)
Dept: ANTEPARTUM | Facility: CLINIC | Age: 23
End: 2018-10-24
Payer: MEDICAID

## 2018-10-24 ENCOUNTER — ASOB RESULT (OUTPATIENT)
Age: 23
End: 2018-10-24

## 2018-10-24 PROCEDURE — 76813 OB US NUCHAL MEAS 1 GEST: CPT

## 2018-12-12 ENCOUNTER — ASOB RESULT (OUTPATIENT)
Age: 23
End: 2018-12-12

## 2018-12-12 ENCOUNTER — APPOINTMENT (OUTPATIENT)
Dept: ANTEPARTUM | Facility: CLINIC | Age: 23
End: 2018-12-12
Payer: MEDICAID

## 2018-12-12 PROCEDURE — 76805 OB US >/= 14 WKS SNGL FETUS: CPT

## 2019-02-08 ENCOUNTER — APPOINTMENT (OUTPATIENT)
Dept: ANTEPARTUM | Facility: CLINIC | Age: 24
End: 2019-02-08

## 2019-03-14 ENCOUNTER — INPATIENT (INPATIENT)
Facility: HOSPITAL | Age: 24
LOS: 3 days | Discharge: HOME | End: 2019-03-18
Attending: OBSTETRICS & GYNECOLOGY | Admitting: OBSTETRICS & GYNECOLOGY
Payer: MEDICAID

## 2019-03-14 VITALS — HEIGHT: 60 IN | WEIGHT: 141.1 LBS

## 2019-03-14 LAB
ALBUMIN SERPL ELPH-MCNC: 3.1 G/DL — LOW (ref 3.3–5)
ALP SERPL-CCNC: 134 U/L — HIGH (ref 40–120)
ALT FLD-CCNC: 19 U/L — SIGNIFICANT CHANGE UP (ref 12–78)
ANION GAP SERPL CALC-SCNC: 9 MMOL/L — SIGNIFICANT CHANGE UP (ref 5–17)
APPEARANCE UR: CLEAR — SIGNIFICANT CHANGE UP
AST SERPL-CCNC: 21 U/L — SIGNIFICANT CHANGE UP (ref 15–37)
BACTERIA # UR AUTO: ABNORMAL
BILIRUB SERPL-MCNC: 0.3 MG/DL — SIGNIFICANT CHANGE UP (ref 0.2–1.2)
BILIRUB UR-MCNC: NEGATIVE — SIGNIFICANT CHANGE UP
BLD GP AB SCN SERPL QL: SIGNIFICANT CHANGE UP
BUN SERPL-MCNC: 5 MG/DL — LOW (ref 7–23)
CALCIUM SERPL-MCNC: 8.8 MG/DL — SIGNIFICANT CHANGE UP (ref 8.5–10.1)
CHLORIDE SERPL-SCNC: 105 MMOL/L — SIGNIFICANT CHANGE UP (ref 96–108)
CO2 SERPL-SCNC: 21 MMOL/L — LOW (ref 22–31)
COLOR SPEC: YELLOW — SIGNIFICANT CHANGE UP
CREAT ?TM UR-MCNC: 14 MG/DL — SIGNIFICANT CHANGE UP
CREAT SERPL-MCNC: 0.52 MG/DL — SIGNIFICANT CHANGE UP (ref 0.5–1.3)
DIFF PNL FLD: NEGATIVE — SIGNIFICANT CHANGE UP
EPI CELLS # UR: SIGNIFICANT CHANGE UP
GLUCOSE BLDC GLUCOMTR-MCNC: 81 MG/DL — SIGNIFICANT CHANGE UP (ref 70–99)
GLUCOSE SERPL-MCNC: 79 MG/DL — SIGNIFICANT CHANGE UP (ref 70–99)
GLUCOSE UR QL: NEGATIVE MG/DL — SIGNIFICANT CHANGE UP
HCT VFR BLD CALC: 37.6 % — SIGNIFICANT CHANGE UP (ref 34.5–45)
HGB BLD-MCNC: 12.8 G/DL — SIGNIFICANT CHANGE UP (ref 11.5–15.5)
KETONES UR-MCNC: NEGATIVE — SIGNIFICANT CHANGE UP
LDH SERPL L TO P-CCNC: 156 U/L — SIGNIFICANT CHANGE UP (ref 84–241)
LEUKOCYTE ESTERASE UR-ACNC: NEGATIVE — SIGNIFICANT CHANGE UP
MCHC RBC-ENTMCNC: 31.8 PG — SIGNIFICANT CHANGE UP (ref 27–34)
MCHC RBC-ENTMCNC: 34 GM/DL — SIGNIFICANT CHANGE UP (ref 32–36)
MCV RBC AUTO: 93.3 FL — SIGNIFICANT CHANGE UP (ref 80–100)
NITRITE UR-MCNC: NEGATIVE — SIGNIFICANT CHANGE UP
NRBC # BLD: 0 /100 WBCS — SIGNIFICANT CHANGE UP (ref 0–0)
PH UR: 7 — SIGNIFICANT CHANGE UP (ref 5–8)
PLATELET # BLD AUTO: 283 K/UL — SIGNIFICANT CHANGE UP (ref 150–400)
POTASSIUM SERPL-MCNC: 3.7 MMOL/L — SIGNIFICANT CHANGE UP (ref 3.5–5.3)
POTASSIUM SERPL-SCNC: 3.7 MMOL/L — SIGNIFICANT CHANGE UP (ref 3.5–5.3)
PROT ?TM UR-MCNC: 49 MG/DL — HIGH (ref 0–12)
PROT SERPL-MCNC: 7.2 GM/DL — SIGNIFICANT CHANGE UP (ref 6–8.3)
PROT UR-MCNC: 100 MG/DL
PROT/CREAT UR-RTO: 3.5 RATIO — HIGH (ref 0–0.2)
RBC # BLD: 4.03 M/UL — SIGNIFICANT CHANGE UP (ref 3.8–5.2)
RBC # FLD: 12.3 % — SIGNIFICANT CHANGE UP (ref 10.3–14.5)
RBC CASTS # UR COMP ASSIST: ABNORMAL /HPF (ref 0–4)
SODIUM SERPL-SCNC: 135 MMOL/L — SIGNIFICANT CHANGE UP (ref 135–145)
SP GR SPEC: 1 — LOW (ref 1.01–1.02)
TYPE + AB SCN PNL BLD: SIGNIFICANT CHANGE UP
UROBILINOGEN FLD QL: NEGATIVE MG/DL — SIGNIFICANT CHANGE UP
WBC # BLD: 7.03 K/UL — SIGNIFICANT CHANGE UP (ref 3.8–10.5)
WBC # FLD AUTO: 7.03 K/UL — SIGNIFICANT CHANGE UP (ref 3.8–10.5)
WBC UR QL: SIGNIFICANT CHANGE UP

## 2019-03-14 RX ORDER — SODIUM CHLORIDE 9 MG/ML
1000 INJECTION, SOLUTION INTRAVENOUS
Qty: 0 | Refills: 0 | Status: DISCONTINUED | OUTPATIENT
Start: 2019-03-14 | End: 2019-03-16

## 2019-03-14 RX ORDER — MAGNESIUM SULFATE 500 MG/ML
4 VIAL (ML) INJECTION ONCE
Qty: 0 | Refills: 0 | Status: COMPLETED | OUTPATIENT
Start: 2019-03-14 | End: 2019-03-14

## 2019-03-14 RX ORDER — ACETAMINOPHEN 500 MG
500 TABLET ORAL EVERY 4 HOURS
Qty: 0 | Refills: 0 | Status: DISCONTINUED | OUTPATIENT
Start: 2019-03-14 | End: 2019-03-15

## 2019-03-14 RX ORDER — AMPICILLIN TRIHYDRATE 250 MG
CAPSULE ORAL
Qty: 0 | Refills: 0 | Status: DISCONTINUED | OUTPATIENT
Start: 2019-03-14 | End: 2019-03-16

## 2019-03-14 RX ORDER — ACETAMINOPHEN 500 MG
650 TABLET ORAL EVERY 4 HOURS
Qty: 0 | Refills: 0 | Status: DISCONTINUED | OUTPATIENT
Start: 2019-03-14 | End: 2019-03-14

## 2019-03-14 RX ORDER — AMPICILLIN TRIHYDRATE 250 MG
1 CAPSULE ORAL EVERY 4 HOURS
Qty: 0 | Refills: 0 | Status: DISCONTINUED | OUTPATIENT
Start: 2019-03-15 | End: 2019-03-16

## 2019-03-14 RX ORDER — AMPICILLIN TRIHYDRATE 250 MG
2 CAPSULE ORAL ONCE
Qty: 0 | Refills: 0 | Status: COMPLETED | OUTPATIENT
Start: 2019-03-14 | End: 2019-03-14

## 2019-03-14 RX ORDER — MAGNESIUM SULFATE 500 MG/ML
2 VIAL (ML) INJECTION
Qty: 40 | Refills: 0 | Status: DISCONTINUED | OUTPATIENT
Start: 2019-03-14 | End: 2019-03-18

## 2019-03-14 RX ORDER — SODIUM CHLORIDE 9 MG/ML
3 INJECTION INTRAMUSCULAR; INTRAVENOUS; SUBCUTANEOUS ONCE
Qty: 0 | Refills: 0 | Status: DISCONTINUED | OUTPATIENT
Start: 2019-03-14 | End: 2019-03-18

## 2019-03-14 RX ADMIN — SODIUM CHLORIDE 50 MILLILITER(S): 9 INJECTION, SOLUTION INTRAVENOUS at 21:29

## 2019-03-14 RX ADMIN — Medication 116 GRAM(S): at 21:54

## 2019-03-14 RX ADMIN — Medication 500 MILLIGRAM(S): at 21:15

## 2019-03-14 RX ADMIN — Medication 12 MILLIGRAM(S): at 22:15

## 2019-03-14 RX ADMIN — Medication 300 GRAM(S): at 21:20

## 2019-03-14 RX ADMIN — Medication 50 GM/HR: at 21:50

## 2019-03-14 RX ADMIN — Medication 500 MILLIGRAM(S): at 21:00

## 2019-03-14 NOTE — PATIENT PROFILE OB - CURRENT PREGNANCY COMPLICATIONS, OB PROFILE
Gestational Diabetes/Preeclampsia Gestational Diabetes/Gestational Age less than 36 Weeks/Maternal Unknown GBS/Preeclampsia

## 2019-03-15 LAB
APTT BLD: 27.9 SEC — SIGNIFICANT CHANGE UP (ref 27.5–36.3)
GLUCOSE BLDC GLUCOMTR-MCNC: 109 MG/DL — HIGH (ref 70–99)
GLUCOSE BLDC GLUCOMTR-MCNC: 111 MG/DL — HIGH (ref 70–99)
GLUCOSE BLDC GLUCOMTR-MCNC: 117 MG/DL — HIGH (ref 70–99)
GLUCOSE BLDC GLUCOMTR-MCNC: 95 MG/DL — SIGNIFICANT CHANGE UP (ref 70–99)
INR BLD: 0.89 RATIO — SIGNIFICANT CHANGE UP (ref 0.88–1.16)
MAGNESIUM SERPL-MCNC: 5.7 MG/DL — HIGH (ref 1.6–2.6)
MAGNESIUM SERPL-MCNC: 5.8 MG/DL — HIGH (ref 1.6–2.6)
MAGNESIUM SERPL-MCNC: 6.2 MG/DL — HIGH (ref 1.6–2.6)
MAGNESIUM SERPL-MCNC: 6.5 MG/DL — HIGH (ref 1.6–2.6)
PROTHROM AB SERPL-ACNC: 9.8 SEC — LOW (ref 10–12.9)
T PALLIDUM AB TITR SER: NEGATIVE — SIGNIFICANT CHANGE UP

## 2019-03-15 RX ORDER — ACETAMINOPHEN 500 MG
500 TABLET ORAL EVERY 4 HOURS
Qty: 0 | Refills: 0 | Status: DISCONTINUED | OUTPATIENT
Start: 2019-03-15 | End: 2019-03-18

## 2019-03-15 RX ORDER — MAGNESIUM SULFATE 500 MG/ML
1.5 VIAL (ML) INJECTION
Qty: 40 | Refills: 0 | Status: DISCONTINUED | OUTPATIENT
Start: 2019-03-15 | End: 2019-03-18

## 2019-03-15 RX ORDER — OXYTOCIN 10 UNIT/ML
2 VIAL (ML) INJECTION
Qty: 30 | Refills: 0 | Status: DISCONTINUED | OUTPATIENT
Start: 2019-03-15 | End: 2019-03-18

## 2019-03-15 RX ADMIN — Medication 500 MILLIGRAM(S): at 03:00

## 2019-03-15 RX ADMIN — Medication 108 GRAM(S): at 22:01

## 2019-03-15 RX ADMIN — Medication 108 GRAM(S): at 11:15

## 2019-03-15 RX ADMIN — Medication 500 MILLIGRAM(S): at 03:15

## 2019-03-15 RX ADMIN — Medication 108 GRAM(S): at 03:00

## 2019-03-15 RX ADMIN — Medication 108 GRAM(S): at 15:31

## 2019-03-15 RX ADMIN — Medication 12 MILLIGRAM(S): at 22:20

## 2019-03-15 RX ADMIN — Medication 2 MILLIUNIT(S)/MIN: at 15:25

## 2019-03-15 RX ADMIN — Medication 108 GRAM(S): at 06:43

## 2019-03-16 ENCOUNTER — RESULT REVIEW (OUTPATIENT)
Age: 24
End: 2019-03-16

## 2019-03-16 LAB
ALBUMIN SERPL ELPH-MCNC: 2.4 G/DL — LOW (ref 3.3–5)
ALP SERPL-CCNC: 118 U/L — SIGNIFICANT CHANGE UP (ref 40–120)
ALT FLD-CCNC: 14 U/L — SIGNIFICANT CHANGE UP (ref 12–78)
ANION GAP SERPL CALC-SCNC: 13 MMOL/L — SIGNIFICANT CHANGE UP (ref 5–17)
APTT BLD: 23 SEC — LOW (ref 27.5–36.3)
AST SERPL-CCNC: 16 U/L — SIGNIFICANT CHANGE UP (ref 15–37)
BASOPHILS # BLD AUTO: 0.01 K/UL — SIGNIFICANT CHANGE UP (ref 0–0.2)
BASOPHILS NFR BLD AUTO: 0.1 % — SIGNIFICANT CHANGE UP (ref 0–2)
BILIRUB SERPL-MCNC: 0.5 MG/DL — SIGNIFICANT CHANGE UP (ref 0.2–1.2)
BUN SERPL-MCNC: 7 MG/DL — SIGNIFICANT CHANGE UP (ref 7–23)
CALCIUM SERPL-MCNC: 6.6 MG/DL — LOW (ref 8.5–10.1)
CHLORIDE SERPL-SCNC: 101 MMOL/L — SIGNIFICANT CHANGE UP (ref 96–108)
CO2 SERPL-SCNC: 20 MMOL/L — LOW (ref 22–31)
CREAT SERPL-MCNC: 0.59 MG/DL — SIGNIFICANT CHANGE UP (ref 0.5–1.3)
EOSINOPHIL # BLD AUTO: 0 K/UL — SIGNIFICANT CHANGE UP (ref 0–0.5)
EOSINOPHIL NFR BLD AUTO: 0 % — SIGNIFICANT CHANGE UP (ref 0–6)
GLUCOSE BLDC GLUCOMTR-MCNC: 119 MG/DL — HIGH (ref 70–99)
GLUCOSE SERPL-MCNC: 105 MG/DL — HIGH (ref 70–99)
GROUP B BETA STREP DNA (PCR): SIGNIFICANT CHANGE UP
GROUP B BETA STREP INTERPRETATION: SIGNIFICANT CHANGE UP
HCT VFR BLD CALC: 32.5 % — LOW (ref 34.5–45)
HGB BLD-MCNC: 11.1 G/DL — LOW (ref 11.5–15.5)
IMM GRANULOCYTES NFR BLD AUTO: 0.7 % — SIGNIFICANT CHANGE UP (ref 0–1.5)
INR BLD: 0.9 RATIO — SIGNIFICANT CHANGE UP (ref 0.88–1.16)
LYMPHOCYTES # BLD AUTO: 0.7 K/UL — LOW (ref 1–3.3)
LYMPHOCYTES # BLD AUTO: 6.5 % — LOW (ref 13–44)
MAGNESIUM SERPL-MCNC: 4.8 MG/DL — HIGH (ref 1.6–2.6)
MAGNESIUM SERPL-MCNC: 5.3 MG/DL — HIGH (ref 1.6–2.6)
MAGNESIUM SERPL-MCNC: 5.9 MG/DL — HIGH (ref 1.6–2.6)
MAGNESIUM SERPL-MCNC: 6 MG/DL — HIGH (ref 1.6–2.6)
MCHC RBC-ENTMCNC: 32.2 PG — SIGNIFICANT CHANGE UP (ref 27–34)
MCHC RBC-ENTMCNC: 34.2 GM/DL — SIGNIFICANT CHANGE UP (ref 32–36)
MCV RBC AUTO: 94.2 FL — SIGNIFICANT CHANGE UP (ref 80–100)
MONOCYTES # BLD AUTO: 0.37 K/UL — SIGNIFICANT CHANGE UP (ref 0–0.9)
MONOCYTES NFR BLD AUTO: 3.5 % — SIGNIFICANT CHANGE UP (ref 2–14)
NEUTROPHILS # BLD AUTO: 9.54 K/UL — HIGH (ref 1.8–7.4)
NEUTROPHILS NFR BLD AUTO: 89.2 % — HIGH (ref 43–77)
NRBC # BLD: 0 /100 WBCS — SIGNIFICANT CHANGE UP (ref 0–0)
PLATELET # BLD AUTO: 258 K/UL — SIGNIFICANT CHANGE UP (ref 150–400)
POTASSIUM SERPL-MCNC: 3.8 MMOL/L — SIGNIFICANT CHANGE UP (ref 3.5–5.3)
POTASSIUM SERPL-SCNC: 3.8 MMOL/L — SIGNIFICANT CHANGE UP (ref 3.5–5.3)
PROT SERPL-MCNC: 5.8 GM/DL — LOW (ref 6–8.3)
PROTHROM AB SERPL-ACNC: 10 SEC — SIGNIFICANT CHANGE UP (ref 10–12.9)
RBC # BLD: 3.45 M/UL — LOW (ref 3.8–5.2)
RBC # FLD: 12.4 % — SIGNIFICANT CHANGE UP (ref 10.3–14.5)
SODIUM SERPL-SCNC: 134 MMOL/L — LOW (ref 135–145)
SOURCE GROUP B STREP: SIGNIFICANT CHANGE UP
WBC # BLD: 10.7 K/UL — HIGH (ref 3.8–10.5)
WBC # FLD AUTO: 10.7 K/UL — HIGH (ref 3.8–10.5)

## 2019-03-16 PROCEDURE — 88307 TISSUE EXAM BY PATHOLOGIST: CPT | Mod: 26

## 2019-03-16 RX ORDER — OXYTOCIN 10 UNIT/ML
25 VIAL (ML) INJECTION
Qty: 20 | Refills: 0 | Status: DISCONTINUED | OUTPATIENT
Start: 2019-03-16 | End: 2019-03-18

## 2019-03-16 RX ORDER — DIBUCAINE 1 %
1 OINTMENT (GRAM) RECTAL EVERY 4 HOURS
Qty: 0 | Refills: 0 | Status: DISCONTINUED | OUTPATIENT
Start: 2019-03-16 | End: 2019-03-18

## 2019-03-16 RX ORDER — OXYTOCIN 10 UNIT/ML
333.33 VIAL (ML) INJECTION
Qty: 20 | Refills: 0 | Status: DISCONTINUED | OUTPATIENT
Start: 2019-03-16 | End: 2019-03-18

## 2019-03-16 RX ORDER — GLYCERIN ADULT
1 SUPPOSITORY, RECTAL RECTAL AT BEDTIME
Qty: 0 | Refills: 0 | Status: DISCONTINUED | OUTPATIENT
Start: 2019-03-16 | End: 2019-03-18

## 2019-03-16 RX ORDER — SODIUM CHLORIDE 9 MG/ML
3 INJECTION INTRAMUSCULAR; INTRAVENOUS; SUBCUTANEOUS EVERY 8 HOURS
Qty: 0 | Refills: 0 | Status: DISCONTINUED | OUTPATIENT
Start: 2019-03-16 | End: 2019-03-16

## 2019-03-16 RX ORDER — ACETAMINOPHEN 500 MG
650 TABLET ORAL EVERY 6 HOURS
Qty: 0 | Refills: 0 | Status: DISCONTINUED | OUTPATIENT
Start: 2019-03-16 | End: 2019-03-18

## 2019-03-16 RX ORDER — DIPHENHYDRAMINE HCL 50 MG
25 CAPSULE ORAL EVERY 6 HOURS
Qty: 0 | Refills: 0 | Status: DISCONTINUED | OUTPATIENT
Start: 2019-03-16 | End: 2019-03-18

## 2019-03-16 RX ORDER — DIBUCAINE 1 %
1 OINTMENT (GRAM) RECTAL EVERY 4 HOURS
Qty: 0 | Refills: 0 | Status: DISCONTINUED | OUTPATIENT
Start: 2019-03-16 | End: 2019-03-16

## 2019-03-16 RX ORDER — SIMETHICONE 80 MG/1
80 TABLET, CHEWABLE ORAL EVERY 6 HOURS
Qty: 0 | Refills: 0 | Status: DISCONTINUED | OUTPATIENT
Start: 2019-03-16 | End: 2019-03-18

## 2019-03-16 RX ORDER — MAGNESIUM HYDROXIDE 400 MG/1
30 TABLET, CHEWABLE ORAL
Qty: 0 | Refills: 0 | Status: DISCONTINUED | OUTPATIENT
Start: 2019-03-16 | End: 2019-03-18

## 2019-03-16 RX ORDER — SODIUM CHLORIDE 9 MG/ML
3 INJECTION INTRAMUSCULAR; INTRAVENOUS; SUBCUTANEOUS EVERY 8 HOURS
Qty: 0 | Refills: 0 | Status: DISCONTINUED | OUTPATIENT
Start: 2019-03-16 | End: 2019-03-18

## 2019-03-16 RX ORDER — AER TRAVELER 0.5 G/1
1 SOLUTION RECTAL; TOPICAL EVERY 4 HOURS
Qty: 0 | Refills: 0 | Status: DISCONTINUED | OUTPATIENT
Start: 2019-03-16 | End: 2019-03-16

## 2019-03-16 RX ORDER — CARBOPROST TROMETHAMINE 250 UG/ML
250 INJECTION, SOLUTION INTRAMUSCULAR ONCE
Qty: 0 | Refills: 0 | Status: COMPLETED | OUTPATIENT
Start: 2019-03-16 | End: 2019-03-16

## 2019-03-16 RX ORDER — PRAMOXINE HYDROCHLORIDE 150 MG/15G
1 AEROSOL, FOAM RECTAL EVERY 4 HOURS
Qty: 0 | Refills: 0 | Status: DISCONTINUED | OUTPATIENT
Start: 2019-03-16 | End: 2019-03-16

## 2019-03-16 RX ORDER — DOCUSATE SODIUM 100 MG
100 CAPSULE ORAL
Qty: 0 | Refills: 0 | Status: DISCONTINUED | OUTPATIENT
Start: 2019-03-16 | End: 2019-03-18

## 2019-03-16 RX ORDER — HYDROCORTISONE 1 %
1 OINTMENT (GRAM) TOPICAL EVERY 4 HOURS
Qty: 0 | Refills: 0 | Status: DISCONTINUED | OUTPATIENT
Start: 2019-03-16 | End: 2019-03-18

## 2019-03-16 RX ORDER — IBUPROFEN 200 MG
600 TABLET ORAL EVERY 6 HOURS
Qty: 0 | Refills: 0 | Status: DISCONTINUED | OUTPATIENT
Start: 2019-03-16 | End: 2019-03-18

## 2019-03-16 RX ORDER — HYDROCORTISONE 1 %
1 OINTMENT (GRAM) TOPICAL EVERY 4 HOURS
Qty: 0 | Refills: 0 | Status: DISCONTINUED | OUTPATIENT
Start: 2019-03-16 | End: 2019-03-16

## 2019-03-16 RX ORDER — PRAMOXINE HYDROCHLORIDE 150 MG/15G
1 AEROSOL, FOAM RECTAL EVERY 4 HOURS
Qty: 0 | Refills: 0 | Status: DISCONTINUED | OUTPATIENT
Start: 2019-03-16 | End: 2019-03-18

## 2019-03-16 RX ORDER — TETANUS TOXOID, REDUCED DIPHTHERIA TOXOID AND ACELLULAR PERTUSSIS VACCINE, ADSORBED 5; 2.5; 8; 8; 2.5 [IU]/.5ML; [IU]/.5ML; UG/.5ML; UG/.5ML; UG/.5ML
0.5 SUSPENSION INTRAMUSCULAR ONCE
Qty: 0 | Refills: 0 | Status: DISCONTINUED | OUTPATIENT
Start: 2019-03-16 | End: 2019-03-18

## 2019-03-16 RX ORDER — LANOLIN
1 OINTMENT (GRAM) TOPICAL EVERY 6 HOURS
Qty: 0 | Refills: 0 | Status: DISCONTINUED | OUTPATIENT
Start: 2019-03-16 | End: 2019-03-18

## 2019-03-16 RX ORDER — AER TRAVELER 0.5 G/1
1 SOLUTION RECTAL; TOPICAL EVERY 4 HOURS
Qty: 0 | Refills: 0 | Status: DISCONTINUED | OUTPATIENT
Start: 2019-03-16 | End: 2019-03-18

## 2019-03-16 RX ORDER — IBUPROFEN 200 MG
600 TABLET ORAL EVERY 6 HOURS
Qty: 0 | Refills: 0 | Status: DISCONTINUED | OUTPATIENT
Start: 2019-03-16 | End: 2019-03-16

## 2019-03-16 RX ADMIN — Medication 1 TABLET(S): at 17:55

## 2019-03-16 RX ADMIN — Medication 108 GRAM(S): at 02:02

## 2019-03-16 RX ADMIN — CARBOPROST TROMETHAMINE 250 MICROGRAM(S): 250 INJECTION, SOLUTION INTRAMUSCULAR at 09:50

## 2019-03-16 RX ADMIN — Medication 1000 MILLIUNIT(S)/MIN: at 10:13

## 2019-03-16 RX ADMIN — Medication 75 MILLIUNIT(S)/MIN: at 14:13

## 2019-03-16 RX ADMIN — Medication 108 GRAM(S): at 06:06

## 2019-03-16 RX ADMIN — Medication 37.5 GM/HR: at 02:07

## 2019-03-17 LAB
BASOPHILS # BLD AUTO: 0.01 K/UL — SIGNIFICANT CHANGE UP (ref 0–0.2)
BASOPHILS NFR BLD AUTO: 0.1 % — SIGNIFICANT CHANGE UP (ref 0–2)
EOSINOPHIL # BLD AUTO: 0.02 K/UL — SIGNIFICANT CHANGE UP (ref 0–0.5)
EOSINOPHIL NFR BLD AUTO: 0.2 % — SIGNIFICANT CHANGE UP (ref 0–6)
HCT VFR BLD CALC: 27.8 % — LOW (ref 34.5–45)
HGB BLD-MCNC: 9.2 G/DL — LOW (ref 11.5–15.5)
IMM GRANULOCYTES NFR BLD AUTO: 0.7 % — SIGNIFICANT CHANGE UP (ref 0–1.5)
LYMPHOCYTES # BLD AUTO: 1.49 K/UL — SIGNIFICANT CHANGE UP (ref 1–3.3)
LYMPHOCYTES # BLD AUTO: 15.9 % — SIGNIFICANT CHANGE UP (ref 13–44)
MAGNESIUM SERPL-MCNC: 5.2 MG/DL — HIGH (ref 1.6–2.6)
MCHC RBC-ENTMCNC: 31.6 PG — SIGNIFICANT CHANGE UP (ref 27–34)
MCHC RBC-ENTMCNC: 33.1 GM/DL — SIGNIFICANT CHANGE UP (ref 32–36)
MCV RBC AUTO: 95.5 FL — SIGNIFICANT CHANGE UP (ref 80–100)
MONOCYTES # BLD AUTO: 0.73 K/UL — SIGNIFICANT CHANGE UP (ref 0–0.9)
MONOCYTES NFR BLD AUTO: 7.8 % — SIGNIFICANT CHANGE UP (ref 2–14)
NEUTROPHILS # BLD AUTO: 7.08 K/UL — SIGNIFICANT CHANGE UP (ref 1.8–7.4)
NEUTROPHILS NFR BLD AUTO: 75.3 % — SIGNIFICANT CHANGE UP (ref 43–77)
NRBC # BLD: 0 /100 WBCS — SIGNIFICANT CHANGE UP (ref 0–0)
PLATELET # BLD AUTO: 235 K/UL — SIGNIFICANT CHANGE UP (ref 150–400)
RBC # BLD: 2.91 M/UL — LOW (ref 3.8–5.2)
RBC # FLD: 12.5 % — SIGNIFICANT CHANGE UP (ref 10.3–14.5)
WBC # BLD: 9.4 K/UL — SIGNIFICANT CHANGE UP (ref 3.8–10.5)
WBC # FLD AUTO: 9.4 K/UL — SIGNIFICANT CHANGE UP (ref 3.8–10.5)

## 2019-03-17 RX ADMIN — Medication 600 MILLIGRAM(S): at 04:15

## 2019-03-17 RX ADMIN — SODIUM CHLORIDE 3 MILLILITER(S): 9 INJECTION INTRAMUSCULAR; INTRAVENOUS; SUBCUTANEOUS at 23:18

## 2019-03-17 NOTE — PROGRESS NOTE ADULT - ATTENDING COMMENTS
Interviewed patient. Discussed with resident and agree with findings, assessment and plan.  Karrie Savage MD

## 2019-03-17 NOTE — PROGRESS NOTE ADULT - SUBJECTIVE AND OBJECTIVE BOX
S: Patient evaluated at bedside. She has no complaints. She reports pain is well controlled with pain meds. She denies headache, dizziness, chest pain, palpitations, shortness of breath, nausea, vomiting, heavy vaginal bleeding. She has been ambulating without assistance, voiding spontaneously, tolerating diet, and is breastfeeding. No BM. Passing flatus    O: Vital Signs Last 24 Hrs  T(C): 36.8 (17 Mar 2019 07:30), Max: 37.1 (16 Mar 2019 21:35)  T(F): 98.2 (17 Mar 2019 07:30), Max: 98.8 (16 Mar 2019 21:35)  HR: 90 (17 Mar 2019 07:30) (83 - 102)  BP: 111/69 (17 Mar 2019 07:30) (100/66 - 144/84)  RR: 16 (17 Mar 2019 07:30) (15 - 18)  SpO2: 100% (17 Mar 2019 07:30) (97% - 100%)    Gen: NAD, sitting upright in bed  CV: RRR, no murmurs auscultated  Resp: CTABL, no wheezes or rhonchi auscultates  Abd: Soft, NT/ND, fundus firm  Ext: No tenderness, mild edema, 2+ patellar reflexes    Labs:                        9.2    9.40  )-----------( 235      ( 17 Mar 2019 04:02 )             27.8     Magnesium, Serum (19 @ 04:02)    Magnesium, Serum: 5.2 mg/dL      A: 23y  now PPD# 1 s/p  doing well.    Plan:  - Blood pressures continue to be WNL post-partum  - Routine postpartum care  - Encouraged out of bed  - Regular diet  - will discontinue Magnesium at 10 am (24 hours post-partum)  - will discontinue IV fluids at 10 am as she is tolerating PO S: Patient evaluated at bedside, partner at bedside. She has no complaints this morning. She reports pain is well controlled with pain meds. She denies headache, dizziness, chest pain, palpitations, shortness of breath, nausea, vomiting, heavy vaginal bleeding. Patient has yet to ambulate.  She is voiding spontaneously and tolerating diet. No BM yet. Passing flatus. She plans on breast feeding.     O: Vital Signs Last 24 Hrs  T(C): 36.8 (17 Mar 2019 07:30), Max: 37.1 (16 Mar 2019 21:35)  T(F): 98.2 (17 Mar 2019 07:30), Max: 98.8 (16 Mar 2019 21:35)  HR: 90 (17 Mar 2019 07:30) (83 - 102)  BP: 111/69 (17 Mar 2019 07:30) (100/66 - 144/84)  RR: 16 (17 Mar 2019 07:30) (15 - 18)  SpO2: 100% (17 Mar 2019 07:30) (97% - 100%)    Gen: NAD, sitting upright in bed  CV: RRR, no murmurs auscultated  Resp: CTABL, no wheezes or rhonchi auscultates  Abd: Soft, NT/ND, fundus firm  Ext: No tenderness, mild edema, 2+ patellar reflexes    Labs:                        9.2    9.40  )-----------( 235      ( 17 Mar 2019 04:02 )             27.8     Magnesium, Serum (.19 @ 04:02)    Magnesium, Serum: 5.2 mg/dL      A: 23y  now PPD# 1 s/p  doing well.    Plan:  - Blood pressures continue to be WNL post-partum  - Routine postpartum care  - Encouraged out of bed  - Regular diet  - will discontinue Magnesium at 10 am (24 hours post-partum)  - will discontinue IV fluids at 10 am as she is tolerating PO

## 2019-03-18 ENCOUNTER — TRANSCRIPTION ENCOUNTER (OUTPATIENT)
Age: 24
End: 2019-03-18

## 2019-03-18 VITALS
RESPIRATION RATE: 16 BRPM | DIASTOLIC BLOOD PRESSURE: 94 MMHG | OXYGEN SATURATION: 99 % | TEMPERATURE: 99 F | HEART RATE: 66 BPM | SYSTOLIC BLOOD PRESSURE: 148 MMHG

## 2019-03-18 RX ORDER — IBUPROFEN 200 MG
1 TABLET ORAL
Qty: 0 | Refills: 0 | COMMUNITY
Start: 2019-03-18

## 2019-03-18 RX ADMIN — Medication 1 TABLET(S): at 16:07

## 2019-03-18 RX ADMIN — Medication 0.5 MILLILITER(S): at 16:04

## 2019-03-18 NOTE — DISCHARGE NOTE OB - MEDICATION SUMMARY - MEDICATIONS TO TAKE
I will START or STAY ON the medications listed below when I get home from the hospital:    ibuprofen 600 mg oral tablet  -- 1 tab(s) by mouth every 6 hours, As needed, Moderate Pain (4 - 6)  -- Indication: For moderate pain    Prenatal Multivitamins with Folic Acid 1 mg oral tablet  -- 1 tab(s) by mouth once a day  -- Indication: For routine

## 2019-03-18 NOTE — DISCHARGE NOTE OB - HOSPITAL COURSE
severe preeclampsia, induction of labor, normal spontaneous vaginal delivery, normal postpartum course, discharge home on PPD#2

## 2019-03-18 NOTE — DISCHARGE NOTE OB - YES
Erythema  Erythema means a reddening of the skin. If the condition is just in one area of your body, it can mean that you have inflammation, irritation, or infection of the skin. Erythema over a joint can be a sign of joint infection. When erythema is spread over most of your body, like a rash, it is usually a sign of a more general problem. This could be an allergic reaction, viral or bacterial infection, or an immune system disease.  The cause of your condition is not clear. It may be hard to diagnose the exact cause of an illness in its early stages. More time may be needed before doctors can make a diagnosis.  Home care  Follow these guidelines when caring for yourself at home:  · Watch for any new symptoms. Tell your health care provider about any that show up.  · You may use acetaminophen or ibuprofen to control pain, unless another medicine was prescribed. If you have chronic liver or kidney disease, talk with your provider before using these medicines. Also talk with your provider if you’ve had a stomach ulcer or GI bleeding. Don’t give aspirin to anyone under 18 years of age who is ill with a fever.  · Have anyone who touches your skin wash his or her hands with soap and water.  · Don’t share towels or clothes.  · Keep the affected area clean and dry. Raising the affected area above the level of your heart may help ease swelling.  Follow-up care  Follow up with your health care provider, or as advised.  When to seek medical advice  Call your health care provider right away  if any of these occur:  · Pain or redness that gets worse  · Fluid or pus drains from the reddened area  · New joint pain  · New rash  · Fever of 100.4°F (38°C) or higher, or as directed by your health care provider  · Severe headache, neck pain, drowsiness, or confusion  · Weakness, dizziness, repeated vomiting, or diarrhea   © 7857-4954 [employer]. [address] [place] [address], [place] [address]. All rights reserved. This  information is not intended as a substitute for professional medical care. Always follow your healthcare professional's instructions.         Statement Selected

## 2019-03-18 NOTE — PROGRESS NOTE ADULT - ASSESSMENT
A: 23y  now PPD# 2 s/p  doing well.      Plan:  - Blood pressures continue to be WNL post-partum  - Routine postpartum care  - Encouraged ambulation   - Regular diet  - Plan for discharge home per patient request

## 2019-03-18 NOTE — DISCHARGE NOTE OB - CARE PROVIDER_API CALL
Rosalina Hdez)  Obstetrics and Gynecology  [address] [place] [address]  Phone: [phone]  Fax: [phone]  Follow Up Time:

## 2019-03-18 NOTE — DISCHARGE NOTE OB - PLAN OF CARE
healthy mom, healthy baby severe preeclampsia, induction of labor, normal spontaneous vaginal delivery, normal postpartum course, discharge home on PPD#2

## 2019-03-18 NOTE — DISCHARGE NOTE OB - CARE PLAN
Principal Discharge DX:	 (spontaneous vaginal delivery)  Goal:	healthy mom, healthy baby  Assessment and plan of treatment:	severe preeclampsia, induction of labor, normal spontaneous vaginal delivery, normal postpartum course, discharge home on PPD#2  Secondary Diagnosis:	Severe pre-eclampsia in third trimester

## 2019-03-18 NOTE — PROGRESS NOTE ADULT - NSICDXPROBLEM_GEN_ALL_CORE_FT
PROBLEM DIAGNOSES  Problem:  (normal spontaneous vaginal delivery)  Assessment and Plan: Meeting all discharge milestones.   Desires to be discharged home today.

## 2019-03-18 NOTE — PROGRESS NOTE ADULT - SUBJECTIVE AND OBJECTIVE BOX
S:   Patient evaluated at bedside, partner at bedside.   Patient is feeling well and would like to go home.   She reports pain is well controlled with pain meds.   She denies headache, dizziness, chest pain, palpitations, shortness of breath, nausea, vomiting, heavy vaginal bleeding.   Patient has yet to ambulate. She is voiding spontaneously and tolerating diet. No BM yet. Passing flatus. She plans on breast feeding.     O:  Vital Signs Last 24 Hrs  T(C): 36.9 (18 Mar 2019 05:14), Max: 37.3 (17 Mar 2019 11:30)  T(F): 98.4 (18 Mar 2019 05:14), Max: 99.1 (17 Mar 2019 11:30)  HR: 68 (18 Mar 2019 05:14) (65 - 86)  BP: 128/75 (18 Mar 2019 05:14) (110/70 - 128/75)  RR: 16 (18 Mar 2019 05:14) (16 - 17)  SpO2: 100% (18 Mar 2019 05:14) (99% - 100%)    Gen: NAD, walking in the room   CV: RRR, no murmurs auscultated  Resp: CTABL, no wheezes or rhonchi auscultates  Abd: Soft, NT/ND, fundus firm  Ext: No tenderness, mild edema, 2+ patellar reflexes    Labs:                                   9.2    9.40  )-----------( 235      ( 17 Mar 2019 04:02 )             27.8 ,                       11.1   10.70 )-----------( 258      ( 16 Mar 2019 10:24 )             32.5

## 2019-03-18 NOTE — DISCHARGE NOTE OB - PATIENT PORTAL LINK FT
You can access the [facility] Patient Portal, offered by [facility], by registering with the following website: [email or web address]

## 2019-03-18 NOTE — DISCHARGE NOTE OB - MATERIALS PROVIDED
MMR Vaccination (VIS Pub Date: 2012)/Back To Sleep Handout/Shaken Baby Prevention Handout/[place]  Immunization Record/Guide to Postpartum Care/Breastfeeding Log/Vaccinations/[place]  Screening Program/Bottle Feeding Log/[facility] Hearing Screen Program

## 2019-03-21 DIAGNOSIS — Z3A.33 33 WEEKS GESTATION OF PREGNANCY: ICD-10-CM

## 2019-03-26 LAB — SURGICAL PATHOLOGY FINAL REPORT - CH: SIGNIFICANT CHANGE UP

## 2019-05-06 ENCOUNTER — RESULT REVIEW (OUTPATIENT)
Age: 24
End: 2019-05-06

## 2020-01-25 ENCOUNTER — EMERGENCY (EMERGENCY)
Facility: HOSPITAL | Age: 25
LOS: 0 days | Discharge: HOME | End: 2020-01-25
Attending: EMERGENCY MEDICINE
Payer: MEDICAID

## 2020-01-25 VITALS — HEIGHT: 60 IN | WEIGHT: 125 LBS

## 2020-01-25 VITALS
DIASTOLIC BLOOD PRESSURE: 83 MMHG | HEART RATE: 93 BPM | OXYGEN SATURATION: 97 % | SYSTOLIC BLOOD PRESSURE: 119 MMHG | TEMPERATURE: 99 F | RESPIRATION RATE: 16 BRPM

## 2020-01-25 DIAGNOSIS — R05 COUGH: ICD-10-CM

## 2020-01-25 DIAGNOSIS — J02.8 ACUTE PHARYNGITIS DUE TO OTHER SPECIFIED ORGANISMS: ICD-10-CM

## 2020-01-25 DIAGNOSIS — R50.9 FEVER, UNSPECIFIED: ICD-10-CM

## 2020-01-25 DIAGNOSIS — R07.0 PAIN IN THROAT: ICD-10-CM

## 2020-01-25 PROCEDURE — 99282 EMERGENCY DEPT VISIT SF MDM: CPT

## 2020-01-25 NOTE — ED ADULT NURSE NOTE - NSIMPLEMENTINTERV_GEN_ALL_ED
Implemented All Universal Safety Interventions:  [place] to call system. Call bell, personal items and telephone within reach. Instruct patient to call for assistance. Room bathroom lighting operational. Non-slip footwear when patient is off stretcher. Physically safe environment: no spills, clutter or unnecessary equipment. Stretcher in lowest position, wheels locked, appropriate side rails in place.

## 2020-01-25 NOTE — ED STATDOCS - PROGRESS NOTE DETAILS
25 y/o F with no PMH presents with sore throat, body aches x 5 days. +subjective fever as well. +sick contacts,  recently sick with similar symptoms. Tolerating PO at home. Using tylenol and motrin for symptoms. PCP: Asa godfrey. PE: Well appearing. Cardiac: s1s2, RRR. Lungs: CTAB. Abdomen: NBS x4, soft, nontender. HEENT: Mild oropharyngeal erythema without uvular deviation, tonsilar enlargement/exudates. A/P: Viral pharyngitis. Advised continue with tylenol & motrin at home. Will rx tessalon perles for cough. PCP follow up. - Ray Kruger PA-C

## 2020-01-25 NOTE — ED STATDOCS - OBJECTIVE STATEMENT
24 YOF no PMH presents to the ED c/o sore throat x 5 days. Provider Salvad[place] speaking, acting as  for pt. Associated with subjective fever and body aches. +Headache. +Productive cough. Denies N/V/D. +Sick contact with  at home who had fever. Pharmacy: [facility] ([facility] PCP: Asa Choi. 24 YOF no PMH presents to the ED c/o sore throat x 5 days. Provider Surinamese speaking, acting as  for Surinamese speaking pt. Associated with subjective fever and body aches. +Headache. +Productive cough. Denies N/V/D. +Sick contact with  at home who had fever. Pharmacy: [facility] ([facility]). PCP: Asa Choi.

## 2020-01-25 NOTE — ED STATDOCS - CLINICAL SUMMARY MEDICAL DECISION MAKING FREE TEXT BOX
23 y/o F with no PMH presents with viral pharyngitis. Advised to continue with tylenol & motrin at home. Will rx deana slater dc with PCP follow up.

## 2020-01-25 NOTE — ED STATDOCS - PATIENT PORTAL LINK FT
You can access the FollowMyHealth Patient Portal offered by [facility] by registering at the following website: [email or web address]. By joining [employer]’s FollowMyHealth portal, you will also be able to view your health information using other applications (apps) compatible with our system.

## 2020-01-25 NOTE — ED STATDOCS - NSFOLLOWUPINSTRUCTIONS_ED_ALL_ED_FT
Faringitis    LO QUE NECESITA SABER:    ¿Qué es la faringitis?La faringitis o dolor de garganta es la inflamación de los tejidos y estructuras en lawton faringe (garganta). La faringitis es generalmente causada por tere bacteria. También podría ser causada por un resfriado o el virus de la gripe. Otras causas incluyen el fumar, las alergias o el reflujo estomacal.    ¿Qué signos y síntomas pueden ocurrir con la faringitis?    Dolor de garganta o dolor al tragar      Fiebre, escalofríos y pascual corporales      Ronquera o voz áspera      Tos, flujo o congestión nasal, comezón en los ojos u ojos llorosos      Dolor de boo      Malestar estomacal y pérdida de apetito      Rigidez leve en el perico      Glándulas inflamadas que se sienten janusz bultos duros cuando se toca el perico      Ampollas willy y gurdeep llenas de pus en la parte de atrás de la garganta    ¿Cómo se diagnostica la faringitis?Informe a lawton médico acerca de sherrill síntomas. Posiblemente le revisará por dentro de la garganta y le palpará el perico. También es posible que deba hacerse los siguientes exámenes:     Un cultivo de gargantapuede detectar el germen que está causándole el dolor de garganta. Un cultivo de garganta se realiza frotando un hisopo de algodón contra la parte posterior de lawton garganta.      Los análisis de sangrese podrían usar para mostrar si alguna otra condición médica está causando lawton dolor de garganta.    ¿Cómo se trata la faringitis?La faringitis viral desaparecerá por sí felicia sin necesidad de tratamiento. Lawton dolor de garganta empezará a mejorar dentro de 3 a 5 días en ambos casos de infecciones virales o bacteriales. Es posible que usted necesite alguno de los siguientes:     Los antibióticostratan las infecciones bacterianas.      Los MOE,janusz el ibuprofeno, ayudan a disminuir la inflamación, el dolor y la fiebre. Los MOE pueden causar sangrado estomacal o problemas renales en ciertas personas. Si usted clovis un medicamento anticoagulante, siempre pregúntele a lawton médico si los MOE son seguros para usted. Siempre stacy la etiqueta de kai medicamento y siga las instrucciones.      Acetaminofénalivia el dolor y baja la fiebre. Está disponible sin receta médica. Pregunte la cantidad y la frecuencia con que debe tomarlos. Siga las indicaciones. El acetaminofén puede causar daño en el hígado cuando no se clovis de forma correcta.    ¿Cómo puedo controlar los síntomas?    Ivy gárgaras de agua con sal.Mezcle ¼ de cucharadita de sal en un vaso con 8 onzas de agua tibia y ivy gárgaras. [place] podría ayudar a reducir la inflamación en lawton garganta.      [place] líquidos janusz se le haya indicado.Es posible que usted necesite ingerir más líquidos de lo habitual. Los líquidos pueden ayudar a aliviar lawton garganta y prevenir la deshidratación. Pregunte cuánto líquido debe karrie cada día y cuáles líquidos son los más adecuados para usted.      Use un humidificador de vapor fríopara ayudar a humedecer el aire en lawton habitación y aliviar lawton tos.      Alivie lawton gargantacon pastillas para la tos, hielo y alimentos blandos o helados de agua.    ¿Cómo puedo prevenir la propagación de la faringitis?Cúbrase la boca y nariz cuando tose o estornuda. No comparta alimentos o bebidas. Lávese las tuyet frecuentemente. Utilice agua y jabón. Si no tiene agua y jabón disponibles, entonces puede usar un alcohol para tuyet en gel.    Llame al 911 en sabino de presentar lo siguiente:    Usted tiene dificultad para respirar o tragar porque lawton garganta está inflamada o adolorida.        ¿Cuándo lamont buscar atención inmediata?    Usted está babeando porque le duele demasiado tragar.      Usted tiene fiebre por encima de 102°F (39°C) o le dura más de 3 días.      Usted está confundido.      Usted siente sabor a sekou en lawton garganta.    ¿Cuándo lamont comunicarme con mi médico?    Lawton dolor de garganta empeora.      Usted tiene un bulto adolorido en lawton garganta que no se tomás después de 5 días.      Sherrill síntomas no mejoran después de 5 días.      Usted tiene preguntas o inquietudes acerca de lawton condición o cuidado.    ACUERDOS SOBRE LAWTON CUIDADO:    Usted tiene el derecho de ayudar a planear lawton cuidado. Aprenda todo lo que pueda sobre lawton condición y janusz darle tratamiento. Discuta sherrill opciones de tratamiento con sherrill médicos para decidir el cuidado que usted desea recibir. Usted siempre tiene el derecho de rechazar el tratamiento.       © Copyright [employer] 2020       back to top                      © Copyright [employer] 2020

## 2020-01-25 NOTE — ED ADULT NURSE NOTE - OBJECTIVE STATEMENT
pt  presents to the ED c/o sore throat x 5 days. Provider [place] speaking, acting as  for pt. Associated with subjective fever and body aches. +Headache. +Productive cough. Denies N/V/D. +Sick contact with  at home who had fever.

## 2020-08-13 NOTE — DISCHARGE NOTE OB - CALL YOUR HEALTHCARE PROVIDER IF YOU ARE EXPERIENCING SYMPTOMS OF DEPRESSION THAT LAST MORE THAN THREE DAYS
Patient instructed on urine specimen collection and patient verbalized understanding of instructions, but unable to urinate at this time.  Urinal placed at bedside.   Statement Selected

## 2020-11-18 ENCOUNTER — ASOB RESULT (OUTPATIENT)
Age: 25
End: 2020-11-18

## 2020-11-18 ENCOUNTER — APPOINTMENT (OUTPATIENT)
Dept: ANTEPARTUM | Facility: CLINIC | Age: 25
End: 2020-11-18
Payer: MEDICAID

## 2020-11-18 PROCEDURE — 76813 OB US NUCHAL MEAS 1 GEST: CPT

## 2020-11-24 ENCOUNTER — APPOINTMENT (OUTPATIENT)
Dept: ANTEPARTUM | Facility: CLINIC | Age: 25
End: 2020-11-24

## 2020-11-24 ENCOUNTER — ASOB RESULT (OUTPATIENT)
Age: 25
End: 2020-11-24

## 2020-12-01 ENCOUNTER — LABORATORY RESULT (OUTPATIENT)
Age: 25
End: 2020-12-01

## 2020-12-15 ENCOUNTER — OUTPATIENT (OUTPATIENT)
Dept: OUTPATIENT SERVICES | Age: 25
LOS: 1 days | Discharge: HOME | End: 2020-12-15

## 2020-12-16 ENCOUNTER — APPOINTMENT (OUTPATIENT)
Dept: PEDIATRIC CARDIOLOGY | Facility: CLINIC | Age: 25
End: 2020-12-16
Payer: MEDICAID

## 2020-12-16 PROCEDURE — 99072 ADDL SUPL MATRL&STAF TM PHE: CPT

## 2020-12-16 PROCEDURE — 76825 ECHO EXAM OF FETAL HEART: CPT

## 2020-12-16 PROCEDURE — 76827 ECHO EXAM OF FETAL HEART: CPT

## 2020-12-16 PROCEDURE — 93325 DOPPLER ECHO COLOR FLOW MAPG: CPT

## 2021-01-06 ENCOUNTER — ASOB RESULT (OUTPATIENT)
Age: 26
End: 2021-01-06

## 2021-01-06 ENCOUNTER — APPOINTMENT (OUTPATIENT)
Dept: ANTEPARTUM | Facility: CLINIC | Age: 26
End: 2021-01-06
Payer: MEDICAID

## 2021-01-06 PROCEDURE — 99072 ADDL SUPL MATRL&STAF TM PHE: CPT

## 2021-01-06 PROCEDURE — 76811 OB US DETAILED SNGL FETUS: CPT

## 2021-03-03 ENCOUNTER — APPOINTMENT (OUTPATIENT)
Dept: ANTEPARTUM | Facility: CLINIC | Age: 26
End: 2021-03-03

## 2021-03-03 ENCOUNTER — ASOB RESULT (OUTPATIENT)
Age: 26
End: 2021-03-03

## 2021-03-03 ENCOUNTER — APPOINTMENT (OUTPATIENT)
Dept: ANTEPARTUM | Facility: CLINIC | Age: 26
End: 2021-03-03
Payer: MEDICAID

## 2021-03-03 PROCEDURE — 76816 OB US FOLLOW-UP PER FETUS: CPT

## 2021-03-03 PROCEDURE — 99072 ADDL SUPL MATRL&STAF TM PHE: CPT

## 2021-03-31 ENCOUNTER — APPOINTMENT (OUTPATIENT)
Dept: ANTEPARTUM | Facility: CLINIC | Age: 26
End: 2021-03-31
Payer: MEDICAID

## 2021-03-31 ENCOUNTER — ASOB RESULT (OUTPATIENT)
Age: 26
End: 2021-03-31

## 2021-03-31 PROCEDURE — 99072 ADDL SUPL MATRL&STAF TM PHE: CPT

## 2021-03-31 PROCEDURE — 76816 OB US FOLLOW-UP PER FETUS: CPT

## 2021-04-21 ENCOUNTER — ASOB RESULT (OUTPATIENT)
Age: 26
End: 2021-04-21

## 2021-04-21 ENCOUNTER — APPOINTMENT (OUTPATIENT)
Dept: ANTEPARTUM | Facility: CLINIC | Age: 26
End: 2021-04-21
Payer: MEDICAID

## 2021-04-21 PROCEDURE — 99072 ADDL SUPL MATRL&STAF TM PHE: CPT

## 2021-04-21 PROCEDURE — 76816 OB US FOLLOW-UP PER FETUS: CPT

## 2021-05-17 ENCOUNTER — INPATIENT (INPATIENT)
Facility: HOSPITAL | Age: 26
LOS: 0 days | Discharge: HOME | DRG: 560 | End: 2021-05-18
Attending: OBSTETRICS & GYNECOLOGY | Admitting: OBSTETRICS & GYNECOLOGY
Payer: MEDICAID

## 2021-05-17 VITALS — WEIGHT: 163.14 LBS | HEIGHT: 62 IN

## 2021-05-17 DIAGNOSIS — O26.899 OTHER SPECIFIED PREGNANCY RELATED CONDITIONS, UNSPECIFIED TRIMESTER: ICD-10-CM

## 2021-05-17 LAB
BASOPHILS # BLD AUTO: 0.03 K/UL — SIGNIFICANT CHANGE UP (ref 0–0.2)
BASOPHILS NFR BLD AUTO: 0.3 % — SIGNIFICANT CHANGE UP (ref 0–2)
COVID-19 SPIKE DOMAIN AB INTERP: POSITIVE
COVID-19 SPIKE DOMAIN ANTIBODY RESULT: 177 U/ML — HIGH
EOSINOPHIL # BLD AUTO: 0.07 K/UL — SIGNIFICANT CHANGE UP (ref 0–0.5)
EOSINOPHIL NFR BLD AUTO: 0.8 % — SIGNIFICANT CHANGE UP (ref 0–6)
HCT VFR BLD CALC: 38.2 % — SIGNIFICANT CHANGE UP (ref 34.5–45)
HGB BLD-MCNC: 12.7 G/DL — SIGNIFICANT CHANGE UP (ref 11.5–15.5)
IMM GRANULOCYTES NFR BLD AUTO: 0.6 % — SIGNIFICANT CHANGE UP (ref 0–1.5)
LYMPHOCYTES # BLD AUTO: 1.62 K/UL — SIGNIFICANT CHANGE UP (ref 1–3.3)
LYMPHOCYTES # BLD AUTO: 18 % — SIGNIFICANT CHANGE UP (ref 13–44)
MCHC RBC-ENTMCNC: 31.4 PG — SIGNIFICANT CHANGE UP (ref 27–34)
MCHC RBC-ENTMCNC: 33.2 GM/DL — SIGNIFICANT CHANGE UP (ref 32–36)
MCV RBC AUTO: 94.3 FL — SIGNIFICANT CHANGE UP (ref 80–100)
MONOCYTES # BLD AUTO: 0.58 K/UL — SIGNIFICANT CHANGE UP (ref 0–0.9)
MONOCYTES NFR BLD AUTO: 6.5 % — SIGNIFICANT CHANGE UP (ref 2–14)
NEUTROPHILS # BLD AUTO: 6.64 K/UL — SIGNIFICANT CHANGE UP (ref 1.8–7.4)
NEUTROPHILS NFR BLD AUTO: 73.8 % — SIGNIFICANT CHANGE UP (ref 43–77)
PLATELET # BLD AUTO: 258 K/UL — SIGNIFICANT CHANGE UP (ref 150–400)
RBC # BLD: 4.05 M/UL — SIGNIFICANT CHANGE UP (ref 3.8–5.2)
RBC # FLD: 13.2 % — SIGNIFICANT CHANGE UP (ref 10.3–14.5)
SARS-COV-2 IGG+IGM SERPL QL IA: 177 U/ML — HIGH
SARS-COV-2 IGG+IGM SERPL QL IA: POSITIVE
SARS-COV-2 RNA SPEC QL NAA+PROBE: SIGNIFICANT CHANGE UP
T PALLIDUM AB TITR SER: NEGATIVE — SIGNIFICANT CHANGE UP
WBC # BLD: 8.99 K/UL — SIGNIFICANT CHANGE UP (ref 3.8–10.5)
WBC # FLD AUTO: 8.99 K/UL — SIGNIFICANT CHANGE UP (ref 3.8–10.5)

## 2021-05-17 PROCEDURE — 86901 BLOOD TYPING SEROLOGIC RH(D): CPT

## 2021-05-17 PROCEDURE — 36415 COLL VENOUS BLD VENIPUNCTURE: CPT

## 2021-05-17 PROCEDURE — 85018 HEMOGLOBIN: CPT

## 2021-05-17 PROCEDURE — 85025 COMPLETE CBC W/AUTO DIFF WBC: CPT

## 2021-05-17 PROCEDURE — 86900 BLOOD TYPING SEROLOGIC ABO: CPT

## 2021-05-17 PROCEDURE — 85014 HEMATOCRIT: CPT

## 2021-05-17 PROCEDURE — 59050 FETAL MONITOR W/REPORT: CPT

## 2021-05-17 PROCEDURE — U0005: CPT

## 2021-05-17 PROCEDURE — G0463: CPT

## 2021-05-17 PROCEDURE — 86780 TREPONEMA PALLIDUM: CPT

## 2021-05-17 PROCEDURE — U0003: CPT

## 2021-05-17 PROCEDURE — 86769 SARS-COV-2 COVID-19 ANTIBODY: CPT

## 2021-05-17 PROCEDURE — 86850 RBC ANTIBODY SCREEN: CPT

## 2021-05-17 RX ORDER — DIBUCAINE 1 %
1 OINTMENT (GRAM) RECTAL EVERY 6 HOURS
Refills: 0 | Status: DISCONTINUED | OUTPATIENT
Start: 2021-05-17 | End: 2021-05-18

## 2021-05-17 RX ORDER — BENZOCAINE 10 %
1 GEL (GRAM) MUCOUS MEMBRANE EVERY 6 HOURS
Refills: 0 | Status: DISCONTINUED | OUTPATIENT
Start: 2021-05-17 | End: 2021-05-18

## 2021-05-17 RX ORDER — OXYCODONE HYDROCHLORIDE 5 MG/1
5 TABLET ORAL ONCE
Refills: 0 | Status: DISCONTINUED | OUTPATIENT
Start: 2021-05-17 | End: 2021-05-18

## 2021-05-17 RX ORDER — ACETAMINOPHEN 500 MG
975 TABLET ORAL
Refills: 0 | Status: DISCONTINUED | OUTPATIENT
Start: 2021-05-17 | End: 2021-05-18

## 2021-05-17 RX ORDER — OXYTOCIN 10 UNIT/ML
333.33 VIAL (ML) INJECTION
Qty: 20 | Refills: 0 | Status: DISCONTINUED | OUTPATIENT
Start: 2021-05-17 | End: 2021-05-18

## 2021-05-17 RX ORDER — IBUPROFEN 200 MG
600 TABLET ORAL EVERY 6 HOURS
Refills: 0 | Status: COMPLETED | OUTPATIENT
Start: 2021-05-17 | End: 2022-04-15

## 2021-05-17 RX ORDER — SODIUM CHLORIDE 9 MG/ML
3 INJECTION INTRAMUSCULAR; INTRAVENOUS; SUBCUTANEOUS EVERY 8 HOURS
Refills: 0 | Status: DISCONTINUED | OUTPATIENT
Start: 2021-05-17 | End: 2021-05-18

## 2021-05-17 RX ORDER — IBUPROFEN 200 MG
600 TABLET ORAL EVERY 6 HOURS
Refills: 0 | Status: DISCONTINUED | OUTPATIENT
Start: 2021-05-17 | End: 2021-05-18

## 2021-05-17 RX ORDER — AER TRAVELER 0.5 G/1
1 SOLUTION RECTAL; TOPICAL EVERY 4 HOURS
Refills: 0 | Status: DISCONTINUED | OUTPATIENT
Start: 2021-05-17 | End: 2021-05-18

## 2021-05-17 RX ORDER — SODIUM CHLORIDE 9 MG/ML
1000 INJECTION, SOLUTION INTRAVENOUS
Refills: 0 | Status: DISCONTINUED | OUTPATIENT
Start: 2021-05-17 | End: 2021-05-17

## 2021-05-17 RX ORDER — KETOROLAC TROMETHAMINE 30 MG/ML
30 SYRINGE (ML) INJECTION ONCE
Refills: 0 | Status: DISCONTINUED | OUTPATIENT
Start: 2021-05-17 | End: 2021-05-17

## 2021-05-17 RX ORDER — TETANUS TOXOID, REDUCED DIPHTHERIA TOXOID AND ACELLULAR PERTUSSIS VACCINE, ADSORBED 5; 2.5; 8; 8; 2.5 [IU]/.5ML; [IU]/.5ML; UG/.5ML; UG/.5ML; UG/.5ML
0.5 SUSPENSION INTRAMUSCULAR ONCE
Refills: 0 | Status: DISCONTINUED | OUTPATIENT
Start: 2021-05-17 | End: 2021-05-18

## 2021-05-17 RX ORDER — PRAMOXINE HYDROCHLORIDE 150 MG/15G
1 AEROSOL, FOAM RECTAL EVERY 4 HOURS
Refills: 0 | Status: DISCONTINUED | OUTPATIENT
Start: 2021-05-17 | End: 2021-05-18

## 2021-05-17 RX ORDER — OXYTOCIN 10 UNIT/ML
333.33 VIAL (ML) INJECTION
Qty: 20 | Refills: 0 | Status: DISCONTINUED | OUTPATIENT
Start: 2021-05-17 | End: 2021-05-17

## 2021-05-17 RX ORDER — SIMETHICONE 80 MG/1
80 TABLET, CHEWABLE ORAL EVERY 4 HOURS
Refills: 0 | Status: DISCONTINUED | OUTPATIENT
Start: 2021-05-17 | End: 2021-05-18

## 2021-05-17 RX ORDER — MAGNESIUM HYDROXIDE 400 MG/1
30 TABLET, CHEWABLE ORAL
Refills: 0 | Status: DISCONTINUED | OUTPATIENT
Start: 2021-05-17 | End: 2021-05-18

## 2021-05-17 RX ORDER — HYDROCORTISONE 1 %
1 OINTMENT (GRAM) TOPICAL EVERY 6 HOURS
Refills: 0 | Status: DISCONTINUED | OUTPATIENT
Start: 2021-05-17 | End: 2021-05-18

## 2021-05-17 RX ORDER — OXYCODONE HYDROCHLORIDE 5 MG/1
5 TABLET ORAL
Refills: 0 | Status: DISCONTINUED | OUTPATIENT
Start: 2021-05-17 | End: 2021-05-18

## 2021-05-17 RX ORDER — CITRIC ACID/SODIUM CITRATE 300-500 MG
30 SOLUTION, ORAL ORAL ONCE
Refills: 0 | Status: DISCONTINUED | OUTPATIENT
Start: 2021-05-17 | End: 2021-05-17

## 2021-05-17 RX ORDER — LANOLIN
1 OINTMENT (GRAM) TOPICAL EVERY 6 HOURS
Refills: 0 | Status: DISCONTINUED | OUTPATIENT
Start: 2021-05-17 | End: 2021-05-18

## 2021-05-17 RX ORDER — DIPHENHYDRAMINE HCL 50 MG
25 CAPSULE ORAL EVERY 6 HOURS
Refills: 0 | Status: DISCONTINUED | OUTPATIENT
Start: 2021-05-17 | End: 2021-05-18

## 2021-05-17 RX ADMIN — SODIUM CHLORIDE 125 MILLILITER(S): 9 INJECTION, SOLUTION INTRAVENOUS at 06:09

## 2021-05-17 RX ADMIN — Medication 1000 MILLIUNIT(S)/MIN: at 06:55

## 2021-05-17 RX ADMIN — SODIUM CHLORIDE 3 MILLILITER(S): 9 INJECTION INTRAMUSCULAR; INTRAVENOUS; SUBCUTANEOUS at 20:52

## 2021-05-17 RX ADMIN — Medication 30 MILLIGRAM(S): at 07:57

## 2021-05-17 RX ADMIN — Medication 1000 MILLIUNIT(S)/MIN: at 08:23

## 2021-05-17 RX ADMIN — Medication 1 SPRAY(S): at 20:51

## 2021-05-17 RX ADMIN — Medication 1 APPLICATION(S): at 20:51

## 2021-05-18 ENCOUNTER — TRANSCRIPTION ENCOUNTER (OUTPATIENT)
Age: 26
End: 2021-05-18

## 2021-05-18 VITALS
DIASTOLIC BLOOD PRESSURE: 76 MMHG | OXYGEN SATURATION: 98 % | TEMPERATURE: 97 F | SYSTOLIC BLOOD PRESSURE: 116 MMHG | HEART RATE: 83 BPM | RESPIRATION RATE: 16 BRPM

## 2021-05-18 LAB
HCT VFR BLD CALC: 31.3 % — LOW (ref 34.5–45)
HGB BLD-MCNC: 10.4 G/DL — LOW (ref 11.5–15.5)

## 2021-05-18 RX ORDER — ACETAMINOPHEN 500 MG
3 TABLET ORAL
Qty: 0 | Refills: 0 | DISCHARGE
Start: 2021-05-18

## 2021-05-18 RX ADMIN — Medication 600 MILLIGRAM(S): at 18:07

## 2021-05-18 RX ADMIN — Medication 600 MILLIGRAM(S): at 00:56

## 2021-05-18 RX ADMIN — Medication 600 MILLIGRAM(S): at 01:50

## 2021-05-18 RX ADMIN — Medication 1 TABLET(S): at 18:08

## 2021-05-18 NOTE — DISCHARGE NOTE OB - CARE PROVIDER_API CALL
Rosalina Hdez)  Obstetrics and Gynecology  [address] [place] [address]  Phone: [phone]  Fax: [phone]  Follow Up Time:

## 2021-05-18 NOTE — DISCHARGE NOTE OB - PATIENT PORTAL LINK FT
You can access the FollowMyHealth Patient Portal offered by [facility] by registering at the following website: [email or web address]. By joining [employer]’s FollowMyHealth portal, you will also be able to view your health information using other applications (apps) compatible with our system.

## 2021-05-18 NOTE — PROGRESS NOTE ADULT - ASSESSMENT
Patient seen this am. Patient doing well. Patient is breastfeeding. Lochia is minimal. Plan for discharge home tomorrow.

## 2021-05-18 NOTE — DISCHARGE NOTE OB - HOSPITAL COURSE
Patient is a 26yo  delivered via spontaneous vaginal delivery. She was transferred to postpartum unit without complications during her stay. Upon discharge she is voiding, tolerating PO, ambulating, and pain is well controlled.

## 2021-05-18 NOTE — PROGRESS NOTE ADULT - SUBJECTIVE AND OBJECTIVE BOX
Postpartum Note,   Patient is a 25y woman G P     Subjective: Patient seen and examined at bedside. No acute events overnight. Pain well controlled with current pain regimen. She is ambulating well and tolerating PO diet/fluids. She reports normal postpartum bleeding, having used __ pads over the last 24 hrs. She is voiding well and reports flatus / BM. Reports breastfeeding without difficulties. Denies fever, headache, changes in vision, chest pain, SOB, nausea, vomiting. Otherwise, patient feels well without additional complaints.     Physical exam:    Vital Signs Last 24 Hrs  T(C): 36.7 (17 May 2021 21:29), Max: 37.2 (17 May 2021 09:49)  T(F): 98 (17 May 2021 21:29), Max: 98.9 (17 May 2021 09:49)  HR: 83 (17 May 2021 21:29) (83 - 97)  BP: 108/67 (17 May 2021 21:29) (101/68 - 108/67)  BP(mean): --  RR: 18 (17 May 2021 21:29) (16 - 18)  SpO2: 99% (17 May 2021 21:29) (99% - 99%)    Gen: NAD  Breast: Soft, nontender, not engorged  Cardio: S1,S2 no murmurs  Lungs: CTA B/L, no wheezing  Abdomen: Soft, nontender, no distension, firm uterine fundus at umbilicus.  Pelvic: Normal lochia noted  Ext: No calf tenderness bilaterally    LABS:                        12.7   8.99  )-----------( 258      ( 17 May 2021 05:58 )             38.2         Allergies    No Known Allergies    Intolerances      MEDICATIONS  (STANDING):  acetaminophen   Tablet .. 975 milliGRAM(s) Oral <User Schedule>  diphtheria/tetanus/pertussis (acellular) Vaccine (ADAcel) 0.5 milliLiter(s) IntraMuscular once  ibuprofen  Tablet. 600 milliGRAM(s) Oral every 6 hours  oxytocin Infusion 333.333 milliUNIT(s)/Min (1000 mL/Hr) IV Continuous <Continuous>  prenatal multivitamin 1 Tablet(s) Oral daily  sodium chloride 0.9% lock flush 3 milliLiter(s) IV Push every 8 hours    MEDICATIONS  (PRN):  benzocaine 20%/menthol 0.5% Spray 1 Spray(s) Topical every 6 hours PRN for Perineal discomfort  dibucaine 1% Ointment 1 Application(s) Topical every 6 hours PRN Perineal discomfort  diphenhydrAMINE 25 milliGRAM(s) Oral every 6 hours PRN Pruritus  hydrocortisone 1% Cream 1 Application(s) Topical every 6 hours PRN Moderate Pain (4-6)  lanolin Ointment 1 Application(s) Topical every 6 hours PRN nipple soreness  magnesium hydroxide Suspension 30 milliLiter(s) Oral two times a day PRN Constipation  oxyCODONE    IR 5 milliGRAM(s) Oral every 3 hours PRN Moderate to Severe Pain (4-10)  oxyCODONE    IR 5 milliGRAM(s) Oral once PRN Moderate to Severe Pain (4-10)  pramoxine 1%/zinc 5% Cream 1 Application(s) Topical every 4 hours PRN Moderate Pain (4-6)  simethicone 80 milliGRAM(s) Chew every 4 hours PRN Gas  witch hazel Pads 1 Application(s) Topical every 4 hours PRN Perineal discomfort        Assessment and Plan    -s/p  PPD.  -Routine post-partum care.  -Pain well controlled, continue current pain regimen.  -Encouraged ambulation.  -Encouraged PO diet/fluids.  -Encouraged breastfeeding.             Postpartum Note    PPD # 1  Patient is a 25y woman      Subjective: Patient seen and examined at bedside. No acute events overnight. Pain well controlled with current pain regimen. She is ambulating well and tolerating PO diet/fluids. She reports normal postpartum bleeding, having used 3-4 pads over the last 24 hrs. She is voiding well and reports flatus / BM. Reports breastfeeding without difficulties. Denies fever, headache, changes in vision, chest pain, SOB, nausea, vomiting. Otherwise, patient feels well without additional complaints.     Physical exam:    Vital Signs Last 24 Hrs  T(C): 36.7 (17 May 2021 21:29), Max: 37.2 (17 May 2021 09:49)  T(F): 98 (17 May 2021 21:29), Max: 98.9 (17 May 2021 09:49)  HR: 83 (17 May 2021 21:29) (83 - 97)  BP: 108/67 (17 May 2021 21:29) (101/68 - 108/67)  BP(mean): --  RR: 18 (17 May 2021 21:29) (16 - 18)  SpO2: 99% (17 May 2021 21:29) (99% - 99%)    Gen: Well developed female who is in NAD  Cardio: S1,S2 no murmurs  Lungs: CTA B/L, no wheezing  Abdomen: Soft, nontender, no distension, firm uterine fundus, just below umbilicus.  Pelvic: Normal lochia noted  Ext: No calf tenderness bilaterally, no edema    LABS:                        12.7   8.99  )-----------( 258      ( 17 May 2021 05:58 )             38.2         Allergies    No Known Allergies    Intolerances      MEDICATIONS  (STANDING):  acetaminophen   Tablet .. 975 milliGRAM(s) Oral <User Schedule>  diphtheria/tetanus/pertussis (acellular) Vaccine (ADAcel) 0.5 milliLiter(s) IntraMuscular once  ibuprofen  Tablet. 600 milliGRAM(s) Oral every 6 hours  oxytocin Infusion 333.333 milliUNIT(s)/Min (1000 mL/Hr) IV Continuous <Continuous>  prenatal multivitamin 1 Tablet(s) Oral daily  sodium chloride 0.9% lock flush 3 milliLiter(s) IV Push every 8 hours    MEDICATIONS  (PRN):  benzocaine 20%/menthol 0.5% Spray 1 Spray(s) Topical every 6 hours PRN for Perineal discomfort  dibucaine 1% Ointment 1 Application(s) Topical every 6 hours PRN Perineal discomfort  diphenhydrAMINE 25 milliGRAM(s) Oral every 6 hours PRN Pruritus  hydrocortisone 1% Cream 1 Application(s) Topical every 6 hours PRN Moderate Pain (4-6)  lanolin Ointment 1 Application(s) Topical every 6 hours PRN nipple soreness  magnesium hydroxide Suspension 30 milliLiter(s) Oral two times a day PRN Constipation  oxyCODONE    IR 5 milliGRAM(s) Oral every 3 hours PRN Moderate to Severe Pain (4-10)  oxyCODONE    IR 5 milliGRAM(s) Oral once PRN Moderate to Severe Pain (4-10)  pramoxine 1%/zinc 5% Cream 1 Application(s) Topical every 4 hours PRN Moderate Pain (4-6)  simethicone 80 milliGRAM(s) Chew every 4 hours PRN Gas  witch hazel Pads 1 Application(s) Topical every 4 hours PRN Perineal discomfort        Assessment and Plan    -s/p  PPD # 1.  -Routine post-partum care.  -Pain well controlled, continue current pain regimen.  -Encouraged ambulation.  -Encouraged PO diet/fluids.  -Encouraged breastfeeding.

## 2021-05-18 NOTE — DISCHARGE NOTE OB - CARE PLAN
Principal Discharge DX:	 (normal spontaneous vaginal delivery)  Goal:	Recovery  Assessment and plan of treatment:	patient is a 25 year old female who is  who is s/p . Patient is clinically stable to be discharged home on PPD#1

## 2021-05-18 NOTE — PROGRESS NOTE ADULT - PROVIDER SPECIALTY LIST ADULT
Telephone Encounter by Charisma Gonsalves CMA at 03/21/17 11:10 AM     Author:  Charisma Gonsalves CMA Service:  (none) Author Type:  Certified Medical Assistant     Filed:  03/21/17 11:10 AM Encounter Date:  3/17/2017 Status:  Signed     :  Charisma Gonsalves CMA (Certified Medical Assistant)            Brianne received fax. Will close.[[id number]]       Revision History        User Key Date/Time User Provider Type Action    > [email or web address] 03/21/17 11:10 AM Charisma Gonsalves CMA Certified Medical Assistant Sign    M - Manual             OB

## 2021-05-18 NOTE — DISCHARGE NOTE OB - MATERIALS PROVIDED
Vaccinations/[facility]  Screening Program/  Immunization Record/Breastfeeding Log/Bottle Feeding Log/Breastfeeding Mother’s Support Group Information/Guide to Postpartum Care/[facility] Hearing Screen Program/Back To Sleep Handout/Shaken Baby Prevention Handout/Breastfeeding Guide and Packet

## 2021-05-18 NOTE — DISCHARGE NOTE OB - MEDICATION SUMMARY - MEDICATIONS TO TAKE
I will START or STAY ON the medications listed below when I get home from the hospital:    ibuprofen 600 mg oral tablet  -- 1 tab(s) by mouth every 6 hours, As needed, Moderate Pain (4 - 6)  -- Indication: For Pain    Tylenol 325 mg oral tablet  -- 3 tab(s) by mouth   -- Indication: For Pain    Tessalon Perles 100 mg oral capsule  -- 1 cap(s) by mouth every 8 hours, As Needed -for cough   -- May cause drowsiness.  Alcohol may intensify this effect.  Use care when operating dangerous machinery.  Swallow whole.  Do not crush.    -- Indication: For Cough    Prenatal Multivitamins with Folic Acid 1 mg oral tablet  -- 1 tab(s) by mouth once a day  -- Indication: For Vitamens

## 2021-05-18 NOTE — DISCHARGE NOTE OB - PLAN OF CARE
patient is a 25 year old female who is  who is s/p . Patient is clinically stable to be discharged home on PPD#1 Recovery

## 2021-05-24 DIAGNOSIS — Z3A.39 39 WEEKS GESTATION OF PREGNANCY: ICD-10-CM

## 2022-03-11 NOTE — PATIENT PROFILE OB - CAREGIVER
Pt here for vidaza injections. Tolerated all 3 injections to left lower abdomen without difficulty.  
Yes

## 2022-05-10 NOTE — DISCHARGE NOTE OB - ABILITY TO HEAR (WITH HEARING AID OR HEARING APPLIANCE IF NORMALLY USED):
Adequate: hears normal conversation without difficulty
PAST SURGICAL HISTORY:  Cataract Excisiion right eye    H/O cardiac pacemaker 2014    History of cholecystectomy 2009    Tumor of parotid gland s/p resection

## 2022-05-23 NOTE — ED ADULT NURSE NOTE - IN THE PAST 12 MONTHS HAVE YOU USED DRUGS OTHER THAN THOSE REQUIRED FOR MEDICAL REASON?
Significant Event Note    Time of event: 3:49 AM May 23, 2022    Description of event:  Notified of multiple runs of V-tach on telemetry. He is admitted for syncope and has a history of parkinson's, SA node dysfunction s/p pacemaker. Patient is resting comfortably and asymptomatic.     /74 (BP Location: Right arm)   Pulse 97   Temp 98.6  F (37  C) (Oral)   Resp 16   SpO2 97%     Mentation per baseline  CV: RRR, extremities well perfused  Lungs: CTAB    Plan:  BMP and mag stat    Discussed with: bedside nurse    Lisa Aleman MD    
No

## 2022-07-01 NOTE — ED STATDOCS - INTERPRETATION SERVICES DECLINED
Chronic patient Established Note (Follow up visit)      Interval History 7/1/2022:  The patient returns to clinic today for follow up of neck and back pain. He reports increased low back and buttock pain.His pain is worse with prolonged sitting and moving from sitting to standing. He also reports increased pain with bending over. He does report intermittent radiating pain into his right posterior thigh stopping at mid thigh. He does report a fall, which was sitting hard on the ground about a week ago. He denies any acute injury. He continues to take Gabapentin and Norco. He denies any other health changes. His pain today is 6/10.     Interval History 5/9/2022:  The patient returns to clinic today for follow up of back pain. He is s/p right L4/5 and L5/S1 TF NGUYEN on 4/14/2022. He reports 50-60% relief of his low back and leg pain. He has been more active since the procedure. He continues to report low back and hip pain, worse with bending and activity. He denies any radicular leg pain. He continues to repot intermittent neck pain. He continues to take Gabapentin with benefit. He continues to take Norco sparingly as needed. He denies any other health changes. His pain today is 5/10.    Interval History 4/1/2022:  The patient returns to clinic today for follow up of back pain. He is s/p left L3,4,5 RFA on 2/25/2022 and right L3,4,5 RFA on 3/11/2022. He reports relief of his back pain. He reports increased back pain that radiates into the posterior aspect of his right leg to his knee. He denies any left leg pain. His pain is worse with prolonged walking. He reports that he sometimes drags his right leg. He denies any falls. He denies any bowel or bladder incontinence. He continues to take Gabapentin and Norco as needed with benefit. He denies any adverse effects. He denies any other health changes. His pain today is 7/10.    Interval History 2/15/2022:  The patient returns to clinic today for follow up of neck and back  "pain. He reports increased low back pain over the last two weeks. He describes this pain as sharp and aching in nature. He does report intermittent "catching" pain in his lower back, worse with getting out of bed. He denies any radicular leg pain. He continues to report benefit from previous cervical procedures. He reports intermittent neck pain. He continues to take Gabapentin with benefit. He continues to take Norco as needed with benefit and without adverse effects. He denies any other health changes. His pain today is 6/10.    Interval History 11/5/2021:  The patient returns to clinic today for follow up of neck and back pain. He is s/p left C4,5,6,7 RFA on 10/1/2021 and right C4,5,6,7 RFA on 10/15/2021. He reports 50% relief of his neck pain. He reports increased burning and itching pain to his skin near injection sites. He denies any radicular arm pain. He does report increased left sided muscle pain. He is concerned that one of the screws in his cervical hardware is moving. He continues to report benefit with previous lumbar procedures. He reports intermittent low back pain without radicular leg pain. He continues to take Gabapentin with benefit. He continues to take Norco as needed with benefit and without adverse effects. He denies any weakness. He denies any other health changes. His pain today is 5/10.    Interval History 9/17/2021:  The patient returns to clinic today for follow up of neck and back pain. He reports increased neck pain. He denies any radicular arm pain today. He does report intermittent radiating pain into his left shoulder blade and mid back. His pain is worse with activity. He reports that sometimes his pain takes his breath away. He continues to report low back pain, that is tolerable at this time. He continues to take Gabapentin and Norco with benefit and without adverse effects. He denies any other health changes. His pain today is 6/10.    Interval History 6/17/2021:  The patient " returns to clinic today for follow up of neck and back pain. He is s/p right L3,4,5 RFA on 4/23/2021 and left L3,4,5 RFA on 5/7/2021. He reports 50% relief of his low back pain. He continues to report intermittent low back pain. He denies any radicular leg pain. He continues to report neck pain, especially in between the scapula. His pain is worse with prolonged standing, walking, and activity. He continues to take Gabapentin with benefit. He continues to take Norco sparingly for severe pain with benefit and without adverse effects. He denies any other health changes. His pain today is 4/10.    Interval History 4/9/2021:  The patient returns to clinic today for follow up of neck and back pain. He is s/p left C4,5,6,7 RFA on 3/9/2021 and right C4,5,6,7 RFA on 3/26/2021. He reports 50% relief of his neck pain. He reports intermittent neck pain. He has good days and bad days. He continues to report low back pain that is constant, sharp, and aching. He reports intermittent radiating pain into the lateral aspect of his left leg to his knee. He continues to take Gabapentin with benefit. He continues to take Norco as needed sparingly for severe pain. He denies any adverse effects. He denies any other health changes. His pain today is 5/10.    Interval History 3/2/2021:  Sal Navarro presents to the clinic for a follow-up appointment for neck pain . Since the last visit, Sal Navarro states the pain has been worsening. Current pain intensity is 6/10.  Was seen by Angelique Barahona NP on 2/12/2021.     Interval History 2/12/2021:  The patient returns to clinic today for follow up of back pain. He has not been seen in over a year. He did not have imaging due to coronavirus outbreak. He would to reschedule this. He was also considering SCS trial prior to the pandemic. He continues to report low back pain that radiates into the lateral aspect of his left leg to his knee. He denies any radicular right leg pain. His pain  "is worse with bending and walking. He continues to take Gabapentin with benefit. He also takes Norco as needed for severe pain. He denies any adverse effects. He denies any other health changes. His pain today is 6/10.    HPI:  Sal Navarro is a 56 y.o. male who presents today s/p C4-7 ACDF with C5/6 PSIF in 2/2016 with residual chronic midline neck pain that radiates into his shoulder blades bilaterally, R>>L.  This is associated with bilateral hand numbness and tingling.  The hand symptoms did improve following the surgery.  The shooting pains in his arms resolved completely.   This pain is described in detail below.  His post-operative course was complicated by dysphagia that is being treated with speech therapy.  The numbness and the tingling were relieved by the surgery. Now experiences "deep pain along the spine"  Patient has not been seen for over a year, had to re-schedule his imaging studies due to pandemic(now completed). Prior to the pandemic, he was considering a SCS.   Only time he gets relief is laying in bed on a very thin pillow, but that doesn't last very long.   Pain aggravated by movement and even breathing/playing with his grandchild/holding the grandchild's hand. Heat and massage (has a vest) are helpful.   Was seen by Dr. Jan srivastava and received multiple EIS and RFAs.   Compliant with his medication.     Pain Disability Index Review:  Last 3 PDI Scores 7/1/2022 5/9/2022 4/1/2022   Pain Disability Index (PDI) 40 40 47       Pain Medications:  Gabapentin  Norco sparingly  Robaxin    Opioid Contract: yes     report:  Reviewed and consistent with medication use as prescribed.    Pain Procedures:   4/14/2022- Right L4/5 and L5/S1 TF NGUYEN  3/11/2022- Right L3,4,5 RFA  2/25/2022- Left L3,4,5 RFA  10/15/2021- Right C4,5,6,7 RFA  10/1/2021- Left C4,5,6,7 RFA  5/7/2021- Left L3,4,5 RFA   4/23/2021- Right L3,4,5 RFA  3/26/2021- Right C4,5,6,7 RFA  3/9/2021- Left C4,5,6,7 RFA  10/11/19: L4/5 " Interlaminar Epidural Steroid Injection  06/13/19- Left C4-7 RFA  05/03/19:Left L2-5 Lumbar Medial Branch Nerve Thermal Radiofrequency Ablation  12/21/18:Right L2-5 Lumbar Medial Branch Nerve Thermal Radiofrequency Ablation  11/23/18:Cervical Thermal Radiofreqiency Ablation: Right C4-7  09/14/18:C7/R1Qglyiyhy Steroid Injection  06/29/18:LL2/3 Interlaminar Epidural Steroid Injection   03/06/18:L2/3 Interlaminar Epidural Steroid Injection   09/26/17:Bilateral L2-5 Lumbar Medial Branch Nerve Coolief Thermal Radiofrequency Ablation  08/31/17:Bilateral L2-5 Lumbar medial branch blocks   03/28/17:Cervical Conventional Radiofreqiency Ablation: Left C4-7  03/14/17:Cervical Conventional Radiofreqiency Ablation: Right C4-7  10/25/16:Cervical Conventional Radiofreqiency Ablation: Left C4-7  10/11/16: Cervical Conventional Radiofreqiency Ablation: Right C4-7  10/04/16- Cervical Medial Branch Blocks, Bilateral C4-7.     08/31/16:C7/[id number] Steroid Injection    Physical Therapy/Home Exercise: does home exercises now, but not in formal PT.    - 2019 was last time in PT     Imaging:   MRI Lumbar Spine 2/25/2021:  COMPARISON:  07/11/2017     FINDINGS:  The vertebral bodies maintain normal height, signal intensity and alignment.  There is redemonstration of few hemangiomas at multiple levels.  The intervertebral disc spaces are preserved although there is some disc desiccation at multiple levels.  The conus terminates at level L1.  Evaluation of the localizer images and structures surrounding the lumbar spine shows no abnormalities.     L1-L2: No significant disc or joint pathology.     L2-L3: Mild diffuse disc bulge with mild bilateral facet arthropathy and ligamentum flavum hypertrophy results in mild central canal stenosis.  Mild bilateral neural foraminal stenosis is also identified.     L3-L4: There is a diffuse disc bulge with no significant facet arthropathy or ligamentum flavum hypertrophy.  There is mild central canal  stenosis and mild bilateral neural foraminal stenosis.     L4-L5: There is a diffuse disc bulge with ligamentum flavum hypertrophy.  No significant facet arthropathy.  There is mild central canal stenosis with only minimal encroachment on the left neural foramen.  Mild right neural foraminal stenosis is present.     L5-S1: There is a diffuse disc bulge with mild to moderate right facet arthropathy.  No left facet arthropathy.  The ligamentum flavum is normal.  The central canal is patent and the neural foramina are largely patent.  Incidental note is made of an annulus fibrosus tear posteriorly measuring approximately 2 mm.     Impression:     Multilevel degenerative disc and joint disease which is mild and results in mild central canal and neural foraminal stenosis as outlined above.    Xray Cervical Spine 2/25/2021:  COMPARISON:  07/07/2017 .     FINDINGS:  The patient has undergone ACDF from C4 through C7. The instrumentation is intact without evidence of complication.  The vertebral bodies maintain normal height and alignment. The remaining intervertebral disc spaces are preserved.  No significant uncovertebral joint hypertrophy.  The prevertebral soft tissues, odontoid views and lung apices are normal. The neural foramen are patent     Impression:     Postsurgical changes to the thoracic spine without additional evidence for degenerative disc or joint disease.     Allergies:   Review of patient's allergies indicates:   Allergen Reactions    Pcn [penicillins] Hives and Rash    Lipitor [atorvastatin] Other (See Comments)     Muscle cramps, weakness       Current Medications:   Current Outpatient Medications   Medication Sig Dispense Refill    acetaminophen (TYLENOL) 500 MG tablet Take 500 mg by mouth every 6 (six) hours as needed for Pain.      albuterol (PROVENTIL/VENTOLIN HFA) 90 mcg/actuation inhaler       aspirin (ECOTRIN) 81 MG EC tablet Take 81 mg by mouth once daily.      cetirizine (ZYRTEC) 10 MG  tablet Take 10 mg by mouth nightly.  5    FOLIC ACID/MULTIVIT-MIN/LUTEIN (CENTRUM SILVER ORAL) Take by mouth once daily.      gabapentin (NEURONTIN) 300 MG capsule Take 2 capsules (600 mg total) by mouth 2 (two) times daily. 120 capsule 5    HYDROcodone-acetaminophen (NORCO) 7.5-325 mg per tablet Take 1 tablet by mouth every 12 (twelve) hours as needed for Pain. 60 tablet 0    levothyroxine (SYNTHROID) 50 MCG tablet Take 50 mcg by mouth once daily.      meclizine (ANTIVERT) 25 mg tablet Take 25 mg by mouth once daily.       meloxicam (MOBIC) 15 MG tablet Take 15 mg by mouth once daily.      metFORMIN (GLUCOPHAGE) 500 MG tablet Take 1 tablet by mouth 2 (two) times a day.      methocarbamoL (ROBAXIN) 500 MG Tab Take 1 tablet (500 mg total) by mouth 2 (two) times daily. 60 tablet 4    omeprazole (PRILOSEC) 20 MG capsule Take 1 capsule (20 mg total) by mouth once daily. 90 capsule 3    pravastatin (PRAVACHOL) 20 MG tablet       valsartan (DIOVAN) 80 MG tablet Take 40 mg by mouth once daily.   1     No current facility-administered medications for this visit.       REVIEW OF SYSTEMS:    GENERAL:  No weight loss, malaise or fevers.  HEENT:  Negative for frequent or significant headaches.  NECK:  Negative for lumps, goiter  and significant neck swelling.   RESPIRATORY:  Negative for cough, wheezing or shortness of breath.  CARDIOVASCULAR:  Negative for chest pain, leg swelling or palpitations. HTN  GI:  Negative for abdominal discomfort, blood in stools or black stools or change in bowel habits.  MUSCULOSKELETAL:  See HPI.  SKIN:  Negative for lesions, rash, and itching.  PSYCH:  Negative for sleep disturbance, mood disorder and recent psychosocial stressors.  HEMATOLOGY/LYMPHOLOGY:  Negative for prolonged bleeding, bruising easily or swollen nodes.  NEURO:   No history of headaches, syncope, paralysis, seizures or tremors.   ENDO: Thyroid disorder.   All other reviewed and negative other than HPI.    Past  Medical History:  Past Medical History:   Diagnosis Date    Arthritis     Cataract     Cervical back pain with evidence of disc disease     Hiatal hernia     Hypertension     Thyroid disease        Past Surgical History:  Past Surgical History:   Procedure Laterality Date    ACROMIOCLAVICULAR JOINT CYST EXCISION Right     BACK SURGERY      cataracts Bilateral     CERVICAL FUSION      COLONOSCOPY N/A 4/27/2018    Procedure: COLONOSCOPY;  Surgeon: Giovani Medeiros MD;  [facility]  Service: Endoscopy;  Laterality: N/A;    EPIDURAL STEROID INJECTION N/A 10/11/2019    Procedure: INJECTION, STEROID, EPIDURAL;  Surgeon: Kasandra Montoya MD;  Location: [facility] PAIN MGT;  Service: Pain Management;  Laterality: N/A;  Lumbar NGUYEN L4/5    NGUYEN      EYE SURGERY      RADIOFREQUENCY ABLATION  10/2016    cervical spine/Montoya    RADIOFREQUENCY ABLATION Left 5/3/2019    Procedure: RADIOFREQUENCY ABLATION, L2-L5 MEDIALBRANCH;  Surgeon: Kasandra Montoya MD;  Location: [facility] PAIN MGT;  Service: Pain Management;  Laterality: Left;    RADIOFREQUENCY ABLATION Left 6/13/2019    Procedure: RADIOFREQUENCY ABLATION C4-7;  Surgeon: Kasandra Montoya MD;  Location: [facility] PAIN MGT;  Service: Pain Management;  Laterality: Left;    RADIOFREQUENCY ABLATION Left 3/9/2021    Procedure: RADIOFREQUENCY ABLATION C4,C5,C6,C7;  Surgeon: Alex Callahan MD;  Location: [facility] PAIN MGT;  Service: Pain Management;  Laterality: Left;  1 of 2    RADIOFREQUENCY ABLATION Right 3/26/2021    Procedure: RADIOFREQUENCY ABLATION C4,C6,C6,C7;  Surgeon: Alex Callahan MD;  Location: [facility] PAIN MGT;  Service: Pain Management;  Laterality: Right;  2 of 2    RADIOFREQUENCY ABLATION Right 4/23/2021    Procedure: RADIOFREQUENCY ABLATION L3,4,5;  Surgeon: Alex Callahan MD;  Location: [facility] PAIN MGT;  Service: Pain Management;  Laterality: Right;  1 of 2    RADIOFREQUENCY ABLATION Left 5/7/2021    Procedure: RADIOFREQUENCY ABLATION L3,4,5;   Surgeon: Alex Callahan MD;  Location: [facility] PAIN MGT;  Service: Pain Management;  Laterality: Left;  2 of 2    RADIOFREQUENCY ABLATION Left 10/1/2021    Procedure: RADIOFREQUENCY ABLATION LEFT, C4,5,6,7 1 of 2 CONSENT NEEDED;  Surgeon: Alex Callahan MD;  Location: [facility] PAIN MGT;  Service: Pain Management;  Laterality: Left;    RADIOFREQUENCY ABLATION Right 10/15/2021    Procedure: RADIOFREQUENCY ABLATION  RIGHT C4,5,6,7 2 of 2 CONSENT NEEDED;  Surgeon: Alex Callahan MD;  Location: [facility] PAIN MGT;  Service: Pain Management;  Laterality: Right;    RADIOFREQUENCY ABLATION Left 2/25/2022    Procedure: RADIOFREQUENCY ABLATION LEFT L3,4,5 1 of 2, needs consent;  Surgeon: Alex Callahan MD;  Location: [facility] PAIN MGT;  Service: Pain Management;  Laterality: Left;    RADIOFREQUENCY ABLATION Right 3/11/2022    Procedure: RADIOFREQUENCY ABLATION RIGHT L3,4,5 2 of 2, needs consent;  Surgeon: Alex Callahan MD;  Location: [facility] PAIN MGT;  Service: Pain Management;  Laterality: Right;    RETINAL DETACHMENT SURGERY      ROTATOR CUFF REPAIR Right     SPINE SURGERY      cerival fusion     TRANSFORAMINAL EPIDURAL INJECTION OF STEROID Right 4/14/2022    Procedure: INJECTION, STEROID, EPIDURAL, TRANSFORAMINAL APPROACH RIGHT L4/5 & L5/S1 Needs Consent;  Surgeon: Alex Callahan MD;  Location: [facility] PAIN MGT;  Service: Pain Management;  Laterality: Right;    TRIGGER POINT INJECTION Left 6/13/2019    Procedure: INJECTION, TRIGGER POINT;  Surgeon: Kasandra Montoya MD;  Location: [facility] PAIN MGT;  Service: Pain Management;  Laterality: Left;       Family History:  Family History   Problem Relation Age of Onset    Heart disease Mother     Cancer Father     Leukemia Brother     Colon cancer Neg Hx     Celiac disease Neg Hx     Crohn's disease Neg Hx     Esophageal cancer Neg Hx     Inflammatory bowel disease Neg Hx     Irritable bowel syndrome Neg Hx     Liver cancer Neg Hx     Rectal cancer Neg Hx     Stomach  "cancer Neg Hx     Ulcerative colitis Neg Hx        Social History:  Social History     Socioeconomic History    Marital status:    Tobacco Use    Smoking status: Never Smoker    Smokeless tobacco: Never Used   Substance and Sexual Activity    Alcohol use: No    Drug use: No       OBJECTIVE:    BP (!) 133/91 (BP Location: Right arm, Patient Position: Sitting, BP Method: Medium (Automatic))   Pulse 63   Temp 97.1 °F (36.2 °C)   Ht 6' 1" (1.854 m)   Wt 111.8 kg (246 lb 7.6 oz)   BMI 32.52 kg/m²     PHYSICAL EXAMINATION:    General appearance: Well appearing, in no acute distress, alert and oriented x3.  Psych:  Mood and affect appropriate.  Skin: Skin color, texture, turgor normal, no rashes or lesions, in both upper and lower body.  Head/face:  Atraumatic, normocephalic. No palpable lymph nodes  Neck: There is mild pain to palpation over the cervical paraspinous and trapezius muscles bilaterally. Spurling Negative. Limited ROM with mild pain on extension.    Cor: RRR  Pulm: Symmetric chest rise, no respiratory distress noted.   Back: Straight leg raising in the sitting position is negative for radicular leg pain. There is mild pain with palpation over lumbar paraspinals and facet joints bilaterally, R>L. Limited ROM with mild pain on extension. Positive facet loading bilaterally.   Extremities: No deformities, edema, or skin discoloration. Good capillary refill.  Musculoskeletal: Shoulder maneuvers are negative. There is pain with palpation over bilateral SI joints. FABERs is positive bilaterally. Bilateral upper and lower extremity strength is normal and symmetric. No atrophy or tone abnormalities are noted.  Neuro: Bilateral upper and lower extremity coordination and muscle stretch reflexes are physiologic and symmetric.  Plantar response are downgoing. No loss of sensation is noted.  Gait: Antalgic, ambulates with cane for assistance     ASSESSMENT: 57 y.o. year old male with neck and lumbar pain, " consistent with      1. Sacroiliac joint pain  Procedure Order to Pain Management   2. Lumbar spondylosis     3. DDD (degenerative disc disease), lumbar     4. Chronic pain disorder           PLAN:     - Previous imaging reviewed today.    - Schedule for bilateral SI joint injections.     - We can repeat right L4/5 and L5/S1 TF NGUYEN if his radicular pain returns.       - We can repeat lumbar RFA as needed.      - I have stressed the importance of physical activity and a home exercise plan to help with pain and improve health.    - Continue Gabapentin.     - Continue Robaxin 500 mg PRN muscle pain.     - Pain contract signed 2/12/2021.     - Continue Norco 7.5/325 mg BID PRN. He does not need a refill today.      - The patient is here today for a refill of current pain medications and they believe these provide effective pain control and improvements in quality of life.  The patient notes no serious side effects, and feels the benefits outweigh the risks.  The patient was reminded of the pain contract that they signed previously as well as the risks and benefits of the medication including possible death.  The updated [place] [employer] prescription monitoring program was reviewed, and the patient has been found to be compliant with current treatment plan.    - UDS from 5/9/2022 reviewed and consistent.     - Continue home exercise routine.     - RTC 2 weeks after above procedure.     - Dr. Callahan was consulted on the patient and agrees with this plan.    The above plan and management options were discussed at length with patient. Patient is in agreement with the above and verbalized understanding.    Angelique Barahona NP  07/01/2022       /Patient/Caregiver requests family/friend to interpret.

## 2022-11-30 NOTE — ED STATDOCS - CHPI ED SYMPTOM POS
FEVER/PAIN/lower back pain, body aches Initiate Treatment: TAC 0.1% cream- Apply to AA’s of hand BID up to 2 weeks, then prn flares (however long on the steroid is same amount off of steroid) Detail Level: Simple Render In Strict Bullet Format?: No Samples Given: none

## 2023-01-10 NOTE — ED ADULT TRIAGE NOTE - PAIN: PRESENCE, MLM
Pharmacy is requesting medication refill.  Please approve or deny this request.    Rx requested:  Requested Prescriptions     Pending Prescriptions Disp Refills    memantine (NAMENDA) 5 MG tablet [Pharmacy Med Name: Memantine HCl 5 MG Oral Tablet] 60 tablet 0     Sig: Take 1 tablet by mouth twice daily         Last Office Visit:   12/12/2022      Next Visit Date:  Future Appointments   Date Time Provider Alexia Arcos   6/12/2023  3[facility]
complains of pain/discomfort

## 2024-01-18 ENCOUNTER — OUTPATIENT (OUTPATIENT)
Dept: OUTPATIENT SERVICES | Facility: HOSPITAL | Age: 29
LOS: 1 days | End: 2024-01-18
Payer: COMMERCIAL

## 2024-01-18 ENCOUNTER — APPOINTMENT (OUTPATIENT)
Dept: ULTRASOUND IMAGING | Facility: CLINIC | Age: 29
End: 2024-01-18
Payer: SELF-PAY

## 2024-01-18 DIAGNOSIS — N92.6 IRREGULAR MENSTRUATION, UNSPECIFIED: ICD-10-CM

## 2024-01-18 PROCEDURE — 76830 TRANSVAGINAL US NON-OB: CPT

## 2024-01-18 PROCEDURE — 76856 US EXAM PELVIC COMPLETE: CPT

## 2024-01-18 PROCEDURE — 76856 US EXAM PELVIC COMPLETE: CPT | Mod: 26

## 2024-01-18 PROCEDURE — 76830 TRANSVAGINAL US NON-OB: CPT | Mod: 26

## 2024-06-03 NOTE — ED ADULT TRIAGE NOTE - WEIGHT METHOD
Return call.  Patient daughter wanted the phone number to dispatch health.  Patient went to urgent care last week for a wound care.  Daughter stated the wound does not look infected, but she wanted to do a follow up and dispatch health is easier.  [phone].   stated

## 2024-06-18 NOTE — ED ADULT NURSE NOTE - NS ED NURSE RECORD ANOTHER VITAL SIGN
Detail Level: Detailed Quality 130: Documentation Of Current Medications In The Medical Record: Current Medications Documented Additional Notes: No medications Xray Chest 1 View- PORTABLE-Urgent Yes

## 2024-08-19 NOTE — ED ADULT NURSE NOTE - TEMPLATE LIST FOR HEAD TO TOE ASSESSMENT
Imaging from [facility] (MRI lumbar & XR lumbar 7/17/24) in PACS.    General